# Patient Record
Sex: FEMALE | Race: WHITE | Employment: PART TIME | URBAN - METROPOLITAN AREA
[De-identification: names, ages, dates, MRNs, and addresses within clinical notes are randomized per-mention and may not be internally consistent; named-entity substitution may affect disease eponyms.]

---

## 2018-12-12 ENCOUNTER — HOSPITAL ENCOUNTER (EMERGENCY)
Age: 73
Discharge: HOME | End: 2018-12-12
Attending: EMERGENCY MEDICINE
Payer: COMMERCIAL

## 2018-12-12 ENCOUNTER — APPOINTMENT (OUTPATIENT)
Dept: GENERAL RADIOLOGY | Age: 73
End: 2018-12-12
Payer: COMMERCIAL

## 2018-12-12 VITALS
DIASTOLIC BLOOD PRESSURE: 79 MMHG | OXYGEN SATURATION: 100 % | HEART RATE: 74 BPM | RESPIRATION RATE: 20 BRPM | WEIGHT: 145 LBS | TEMPERATURE: 98 F | BODY MASS INDEX: 25.69 KG/M2 | SYSTOLIC BLOOD PRESSURE: 191 MMHG | HEIGHT: 63 IN

## 2018-12-12 DIAGNOSIS — S82.035A CLOSED NONDISPLACED TRANSVERSE FRACTURE OF LEFT PATELLA, INITIAL ENCOUNTER: Primary | ICD-10-CM

## 2018-12-12 PROCEDURE — 6370000000 HC RX 637 (ALT 250 FOR IP): Performed by: EMERGENCY MEDICINE

## 2018-12-12 PROCEDURE — 6360000002 HC RX W HCPCS: Performed by: EMERGENCY MEDICINE

## 2018-12-12 PROCEDURE — 99283 EMERGENCY DEPT VISIT LOW MDM: CPT

## 2018-12-12 PROCEDURE — 73562 X-RAY EXAM OF KNEE 3: CPT

## 2018-12-12 PROCEDURE — 96374 THER/PROPH/DIAG INJ IV PUSH: CPT

## 2018-12-12 RX ORDER — TRAMADOL HYDROCHLORIDE 50 MG/1
50 TABLET ORAL ONCE
Status: COMPLETED | OUTPATIENT
Start: 2018-12-12 | End: 2018-12-12

## 2018-12-12 RX ORDER — TRAMADOL HYDROCHLORIDE 50 MG/1
50 TABLET ORAL EVERY 8 HOURS PRN
Qty: 20 TABLET | Refills: 0 | Status: SHIPPED | OUTPATIENT
Start: 2018-12-12 | End: 2018-12-12

## 2018-12-12 RX ORDER — SIMVASTATIN 80 MG
80 TABLET ORAL NIGHTLY
COMMUNITY

## 2018-12-12 RX ORDER — LEVOTHYROXINE SODIUM 0.12 MG/1
125 TABLET ORAL DAILY
COMMUNITY

## 2018-12-12 RX ORDER — OMEPRAZOLE 20 MG/1
20 CAPSULE, DELAYED RELEASE ORAL DAILY
COMMUNITY

## 2018-12-12 RX ORDER — ONDANSETRON 2 MG/ML
4 INJECTION INTRAMUSCULAR; INTRAVENOUS ONCE
Status: COMPLETED | OUTPATIENT
Start: 2018-12-12 | End: 2018-12-12

## 2018-12-12 RX ORDER — HYDROCODONE BITARTRATE AND ACETAMINOPHEN 5; 325 MG/1; MG/1
1 TABLET ORAL EVERY 6 HOURS PRN
Qty: 20 TABLET | Refills: 0 | Status: SHIPPED | OUTPATIENT
Start: 2018-12-12 | End: 2018-12-18

## 2018-12-12 RX ADMIN — TRAMADOL HYDROCHLORIDE 50 MG: 50 TABLET, FILM COATED ORAL at 18:40

## 2018-12-12 RX ADMIN — ONDANSETRON 4 MG: 2 SOLUTION INTRAMUSCULAR; INTRAVENOUS at 17:29

## 2018-12-12 ASSESSMENT — PAIN DESCRIPTION - ORIENTATION: ORIENTATION: LEFT

## 2018-12-12 ASSESSMENT — ENCOUNTER SYMPTOMS
FACIAL SWELLING: 0
VOICE CHANGE: 0
SORE THROAT: 0
CHEST TIGHTNESS: 0
VOMITING: 0
COUGH: 0
EYE DISCHARGE: 0
DIARRHEA: 0
BLOOD IN STOOL: 0
BACK PAIN: 0
WHEEZING: 0
SINUS PRESSURE: 0
ABDOMINAL PAIN: 0
TROUBLE SWALLOWING: 0
CONSTIPATION: 0
SHORTNESS OF BREATH: 0
CHOKING: 0
EYE REDNESS: 0
EYE PAIN: 0
STRIDOR: 0

## 2018-12-12 ASSESSMENT — PAIN DESCRIPTION - DESCRIPTORS: DESCRIPTORS: SHARP;THROBBING

## 2018-12-12 ASSESSMENT — PAIN DESCRIPTION - LOCATION
LOCATION: KNEE
LOCATION: KNEE

## 2018-12-12 ASSESSMENT — PAIN SCALES - GENERAL
PAINLEVEL_OUTOF10: 10
PAINLEVEL_OUTOF10: 10

## 2018-12-12 ASSESSMENT — PAIN DESCRIPTION - ONSET: ONSET: SUDDEN

## 2018-12-12 ASSESSMENT — PAIN DESCRIPTION - PROGRESSION: CLINICAL_PROGRESSION: NOT CHANGED

## 2018-12-12 ASSESSMENT — PAIN DESCRIPTION - FREQUENCY: FREQUENCY: INTERMITTENT

## 2018-12-12 ASSESSMENT — PAIN DESCRIPTION - PAIN TYPE: TYPE: ACUTE PAIN

## 2018-12-12 NOTE — ED PROVIDER NOTES
m) 145 lb (65.8 kg)       Physical Exam   Constitutional: She is oriented to person, place, and time. She appears well-developed and well-nourished. She appears distressed. HENT:   Head: Normocephalic. Right Ear: External ear normal.   Left Ear: External ear normal.   Nose: Nose normal.   Mouth/Throat: Oropharynx is clear and moist.   Eyes: Pupils are equal, round, and reactive to light. Conjunctivae and EOM are normal.   Neck: Normal range of motion. Neck supple. Cardiovascular: Normal rate and normal heart sounds. Exam reveals no gallop. No murmur heard. Pulmonary/Chest: Breath sounds normal. No respiratory distress. She has no wheezes. Abdominal: Soft. Bowel sounds are normal. She exhibits no mass. There is no rebound. Musculoskeletal: Normal range of motion. She exhibits edema and tenderness. Attention  of the left knee patient has a minimal joint effusion and range of motion slightly diminished has no point tenderness present no laceration or hematoma over the left knee neurovascular is intact   Neurological: She is alert and oriented to person, place, and time. No cranial nerve deficit. She exhibits normal muscle tone. Skin: Skin is warm. No rash noted. No erythema. Psychiatric: Her behavior is normal. Thought content normal.   Nursing note and vitals reviewed. DIAGNOSTIC RESULTS     EKG: All EKG's are interpreted by the Emergency Department Physician who either signs or Co-signsthis chart in the absence of a cardiologist.        RADIOLOGY:   Hines Raudel such as CT, Ultrasound and MRI are read by the radiologist. Plain radiographic images are visualized and preliminarily interpreted by the emergency physician with the below findings:        Interpretation per the Radiologist below, if available at the time ofthis note:    XR KNEE LEFT (3 VIEWS)   Final Result   NONDISPLACED LEFT PATELLAR FRACTURE WITH LEFT KNEE EFFUSION.             ED BEDSIDE ULTRASOUND:   Performed by ED

## 2018-12-12 NOTE — ED TRIAGE NOTES
Bertha Ballesteros at work and injured her left knee. patient has had several surgeries after she had injured the same knee in 1982. Slipped on a piece of [employer].

## 2018-12-12 NOTE — ED NOTES
Registration at bedside with workers comp papers     [facility]  12/12/18 [id number]

## 2019-12-31 ENCOUNTER — HOSPITAL ENCOUNTER (OUTPATIENT)
Dept: LAB | Age: 74
Discharge: HOME | End: 2019-12-31
Payer: MEDICARE

## 2019-12-31 LAB
ALBUMIN SERPL-MCNC: 4.3 G/DL (ref 3.5–4.6)
ALP BLD-CCNC: 68 U/L (ref 40–130)
ALT SERPL-CCNC: 34 U/L (ref 0–33)
ANION GAP SERPL CALCULATED.3IONS-SCNC: 14 MEQ/L (ref 9–15)
AST SERPL-CCNC: 42 U/L (ref 0–35)
BILIRUB SERPL-MCNC: 0.4 MG/DL (ref 0.2–0.7)
BUN BLDV-MCNC: 14 MG/DL (ref 8–23)
CALCIUM SERPL-MCNC: 9.4 MG/DL (ref 8.5–9.9)
CHLORIDE BLD-SCNC: 101 MEQ/L (ref 95–107)
CHOLESTEROL, TOTAL: 171 MG/DL (ref 0–199)
CO2: 26 MEQ/L (ref 20–31)
CREAT SERPL-MCNC: 0.74 MG/DL (ref 0.5–0.9)
GFR AFRICAN AMERICAN: >60
GFR NON-AFRICAN AMERICAN: >60
GLOBULIN: 2.9 G/DL (ref 2.3–3.5)
GLUCOSE BLD-MCNC: 111 MG/DL (ref 70–99)
HCT VFR BLD CALC: 38.2 % (ref 37–47)
HDLC SERPL-MCNC: 48 MG/DL (ref 40–59)
HEMOGLOBIN: 12.7 G/DL (ref 12–16)
LDL CHOLESTEROL CALCULATED: 95 MG/DL (ref 0–129)
MCH RBC QN AUTO: 30.5 PG (ref 27–31.3)
MCHC RBC AUTO-ENTMCNC: 33.2 % (ref 33–37)
MCV RBC AUTO: 91.8 FL (ref 82–100)
PDW BLD-RTO: 13.1 % (ref 11.5–14.5)
PLATELET # BLD: 310 K/UL (ref 130–400)
POTASSIUM SERPL-SCNC: 4.6 MEQ/L (ref 3.4–4.9)
RBC # BLD: 4.16 M/UL (ref 4.2–5.4)
SODIUM BLD-SCNC: 141 MEQ/L (ref 135–144)
TOTAL PROTEIN: 7.2 G/DL (ref 6.3–8)
TRIGL SERPL-MCNC: 138 MG/DL (ref 0–150)
WBC # BLD: 7.7 K/UL (ref 4.8–10.8)

## 2019-12-31 PROCEDURE — 85027 COMPLETE CBC AUTOMATED: CPT

## 2019-12-31 PROCEDURE — 80061 LIPID PANEL: CPT

## 2019-12-31 PROCEDURE — 80053 COMPREHEN METABOLIC PANEL: CPT

## 2019-12-31 PROCEDURE — 36415 COLL VENOUS BLD VENIPUNCTURE: CPT

## 2020-10-22 ENCOUNTER — HOSPITAL ENCOUNTER (OUTPATIENT)
Dept: LAB | Age: 75
Discharge: HOME | End: 2020-10-22
Payer: MEDICARE

## 2020-10-22 LAB
ALBUMIN SERPL-MCNC: 4.5 G/DL (ref 3.5–4.6)
ALP BLD-CCNC: 59 U/L (ref 40–130)
ALT SERPL-CCNC: 28 U/L (ref 0–33)
ANION GAP SERPL CALCULATED.3IONS-SCNC: 12 MEQ/L (ref 9–15)
AST SERPL-CCNC: 46 U/L (ref 0–35)
BILIRUB SERPL-MCNC: 0.4 MG/DL (ref 0.2–0.7)
BUN BLDV-MCNC: 12 MG/DL (ref 8–23)
CALCIUM SERPL-MCNC: 9.3 MG/DL (ref 8.5–9.9)
CHLORIDE BLD-SCNC: 102 MEQ/L (ref 95–107)
CHOLESTEROL, TOTAL: 134 MG/DL (ref 0–199)
CO2: 26 MEQ/L (ref 20–31)
CREAT SERPL-MCNC: 0.72 MG/DL (ref 0.5–0.9)
GFR AFRICAN AMERICAN: >60
GFR NON-AFRICAN AMERICAN: >60
GLOBULIN: 2.5 G/DL (ref 2.3–3.5)
GLUCOSE BLD-MCNC: 119 MG/DL (ref 70–99)
HDLC SERPL-MCNC: 53 MG/DL (ref 40–59)
LDL CHOLESTEROL CALCULATED: 66 MG/DL (ref 0–129)
POTASSIUM SERPL-SCNC: 4.2 MEQ/L (ref 3.4–4.9)
SEDIMENTATION RATE, ERYTHROCYTE: 15 MM (ref 0–30)
SODIUM BLD-SCNC: 140 MEQ/L (ref 135–144)
TOTAL PROTEIN: 7 G/DL (ref 6.3–8)
TRIGL SERPL-MCNC: 77 MG/DL (ref 0–150)

## 2020-10-22 PROCEDURE — 80061 LIPID PANEL: CPT

## 2020-10-22 PROCEDURE — 80053 COMPREHEN METABOLIC PANEL: CPT

## 2020-10-22 PROCEDURE — 36415 COLL VENOUS BLD VENIPUNCTURE: CPT

## 2020-10-22 PROCEDURE — 85652 RBC SED RATE AUTOMATED: CPT

## 2021-04-19 ENCOUNTER — HOSPITAL ENCOUNTER (OUTPATIENT)
Dept: LAB | Age: 76
Discharge: HOME | End: 2021-04-19
Payer: MEDICARE

## 2021-04-19 LAB
ALBUMIN SERPL-MCNC: 4.4 G/DL (ref 3.5–4.6)
ALP BLD-CCNC: 62 U/L (ref 40–130)
ALT SERPL-CCNC: 33 U/L (ref 0–33)
ANION GAP SERPL CALCULATED.3IONS-SCNC: 11 MEQ/L (ref 9–15)
AST SERPL-CCNC: 62 U/L (ref 0–35)
BASOPHILS ABSOLUTE: 0.1 K/UL (ref 0–0.2)
BASOPHILS RELATIVE PERCENT: 1.1 %
BILIRUB SERPL-MCNC: 0.4 MG/DL (ref 0.2–0.7)
BUN BLDV-MCNC: 14 MG/DL (ref 8–23)
CALCIUM SERPL-MCNC: 9.5 MG/DL (ref 8.5–9.9)
CHLORIDE BLD-SCNC: 105 MEQ/L (ref 95–107)
CHOLESTEROL, TOTAL: 134 MG/DL (ref 0–199)
CO2: 25 MEQ/L (ref 20–31)
CREAT SERPL-MCNC: 0.65 MG/DL (ref 0.5–0.9)
EOSINOPHILS ABSOLUTE: 0.2 K/UL (ref 0–0.7)
EOSINOPHILS RELATIVE PERCENT: 3.5 %
GFR AFRICAN AMERICAN: >60
GFR NON-AFRICAN AMERICAN: >60
GLOBULIN: 2.2 G/DL (ref 2.3–3.5)
GLUCOSE BLD-MCNC: 113 MG/DL (ref 70–99)
HCT VFR BLD CALC: 37.2 % (ref 37–47)
HDLC SERPL-MCNC: 52 MG/DL (ref 40–59)
HEMOGLOBIN: 12.3 G/DL (ref 12–16)
LDL CHOLESTEROL CALCULATED: 68 MG/DL (ref 0–129)
LYMPHOCYTES ABSOLUTE: 1.2 K/UL (ref 1–4.8)
LYMPHOCYTES RELATIVE PERCENT: 17.8 %
MCH RBC QN AUTO: 29.4 PG (ref 27–31.3)
MCHC RBC AUTO-ENTMCNC: 33.1 % (ref 33–37)
MCV RBC AUTO: 88.6 FL (ref 82–100)
MONOCYTES ABSOLUTE: 0.6 K/UL (ref 0.2–0.8)
MONOCYTES RELATIVE PERCENT: 8.7 %
NEUTROPHILS ABSOLUTE: 4.7 K/UL (ref 1.4–6.5)
NEUTROPHILS RELATIVE PERCENT: 68.9 %
PDW BLD-RTO: 13.3 % (ref 11.5–14.5)
PLATELET # BLD: 256 K/UL (ref 130–400)
POTASSIUM SERPL-SCNC: 4.1 MEQ/L (ref 3.4–4.9)
RBC # BLD: 4.2 M/UL (ref 4.2–5.4)
SODIUM BLD-SCNC: 141 MEQ/L (ref 135–144)
T4 FREE: 1.28 NG/DL (ref 0.84–1.68)
TOTAL PROTEIN: 6.6 G/DL (ref 6.3–8)
TRIGL SERPL-MCNC: 70 MG/DL (ref 0–150)
TSH SERPL DL<=0.05 MIU/L-ACNC: 1.96 UIU/ML (ref 0.44–3.86)
WBC # BLD: 6.7 K/UL (ref 4.8–10.8)

## 2021-04-19 PROCEDURE — 84443 ASSAY THYROID STIM HORMONE: CPT

## 2021-04-19 PROCEDURE — 80061 LIPID PANEL: CPT

## 2021-04-19 PROCEDURE — 84439 ASSAY OF FREE THYROXINE: CPT

## 2021-04-19 PROCEDURE — 86803 HEPATITIS C AB TEST: CPT

## 2021-04-19 PROCEDURE — 80053 COMPREHEN METABOLIC PANEL: CPT

## 2021-04-19 PROCEDURE — 85025 COMPLETE CBC W/AUTO DIFF WBC: CPT

## 2021-04-19 PROCEDURE — 36415 COLL VENOUS BLD VENIPUNCTURE: CPT

## 2021-04-21 LAB — HEPATITIS C ANTIBODY INTERPRETATION: NORMAL

## 2021-12-14 ENCOUNTER — HOSPITAL ENCOUNTER (OUTPATIENT)
Dept: LAB | Age: 76
Discharge: HOME | End: 2021-12-14
Payer: MEDICARE

## 2021-12-14 LAB
ALBUMIN SERPL-MCNC: 4.5 G/DL (ref 3.5–4.6)
ALP BLD-CCNC: 58 U/L (ref 40–130)
ALT SERPL-CCNC: 28 U/L (ref 0–33)
ANION GAP SERPL CALCULATED.3IONS-SCNC: 10 MEQ/L (ref 9–15)
AST SERPL-CCNC: 38 U/L (ref 0–35)
BILIRUB SERPL-MCNC: 0.3 MG/DL (ref 0.2–0.7)
BUN BLDV-MCNC: 13 MG/DL (ref 8–23)
CALCIUM SERPL-MCNC: 9.2 MG/DL (ref 8.5–9.9)
CHLORIDE BLD-SCNC: 106 MEQ/L (ref 95–107)
CHOLESTEROL, TOTAL: 149 MG/DL (ref 0–199)
CO2: 25 MEQ/L (ref 20–31)
CREAT SERPL-MCNC: 0.7 MG/DL (ref 0.5–0.9)
GFR AFRICAN AMERICAN: >60
GFR NON-AFRICAN AMERICAN: >60
GLOBULIN: 2.6 G/DL (ref 2.3–3.5)
GLUCOSE BLD-MCNC: 122 MG/DL (ref 70–99)
HDLC SERPL-MCNC: 51 MG/DL (ref 40–59)
LDL CHOLESTEROL CALCULATED: 83 MG/DL (ref 0–129)
POTASSIUM SERPL-SCNC: 4.3 MEQ/L (ref 3.4–4.9)
SODIUM BLD-SCNC: 141 MEQ/L (ref 135–144)
T4 FREE: 1.54 NG/DL (ref 0.84–1.68)
TOTAL PROTEIN: 7.1 G/DL (ref 6.3–8)
TRIGL SERPL-MCNC: 73 MG/DL (ref 0–150)
TSH SERPL DL<=0.05 MIU/L-ACNC: 1.9 UIU/ML (ref 0.44–3.86)

## 2021-12-14 PROCEDURE — 80061 LIPID PANEL: CPT

## 2021-12-14 PROCEDURE — 84439 ASSAY OF FREE THYROXINE: CPT

## 2021-12-14 PROCEDURE — 36415 COLL VENOUS BLD VENIPUNCTURE: CPT

## 2021-12-14 PROCEDURE — 80053 COMPREHEN METABOLIC PANEL: CPT

## 2021-12-14 PROCEDURE — 84443 ASSAY THYROID STIM HORMONE: CPT

## 2022-10-31 ENCOUNTER — HOSPITAL ENCOUNTER (OUTPATIENT)
Dept: LAB | Age: 77
Discharge: HOME | End: 2022-10-31
Payer: MEDICARE

## 2022-10-31 LAB
ANION GAP SERPL CALCULATED.3IONS-SCNC: 14 MEQ/L (ref 9–15)
BUN BLDV-MCNC: 25 MG/DL (ref 8–23)
CALCIUM SERPL-MCNC: 9.2 MG/DL (ref 8.5–9.9)
CHLORIDE BLD-SCNC: 105 MEQ/L (ref 95–107)
CO2: 25 MEQ/L (ref 20–31)
CREAT SERPL-MCNC: 0.96 MG/DL (ref 0.5–0.9)
GFR SERPL CREATININE-BSD FRML MDRD: >60 ML/MIN/{1.73_M2}
GLUCOSE BLD-MCNC: 121 MG/DL (ref 70–99)
MAGNESIUM: 2 MG/DL (ref 1.7–2.4)
POTASSIUM SERPL-SCNC: 4.3 MEQ/L (ref 3.4–4.9)
SODIUM BLD-SCNC: 144 MEQ/L (ref 135–144)
TSH SERPL DL<=0.05 MIU/L-ACNC: 0.55 UIU/ML (ref 0.44–3.86)

## 2022-10-31 PROCEDURE — 80048 BASIC METABOLIC PNL TOTAL CA: CPT

## 2022-10-31 PROCEDURE — 83735 ASSAY OF MAGNESIUM: CPT

## 2022-10-31 PROCEDURE — 36415 COLL VENOUS BLD VENIPUNCTURE: CPT

## 2022-10-31 PROCEDURE — 84443 ASSAY THYROID STIM HORMONE: CPT

## 2023-01-26 PROBLEM — K21.9 GERD (GASTROESOPHAGEAL REFLUX DISEASE): Status: ACTIVE | Noted: 2023-01-26

## 2023-01-26 PROBLEM — I65.29 CAROTID STENOSIS, ASYMPTOMATIC: Status: ACTIVE | Noted: 2023-01-26

## 2023-01-26 PROBLEM — E78.5 HYPERLIPIDEMIA: Status: ACTIVE | Noted: 2023-01-26

## 2023-01-26 PROBLEM — R06.02 SHORTNESS OF BREATH ON EXERTION: Status: ACTIVE | Noted: 2023-01-26

## 2023-01-26 PROBLEM — K12.1 STOMATITIS: Status: ACTIVE | Noted: 2023-01-26

## 2023-01-26 PROBLEM — M17.9 OSTEOARTHRITIS OF KNEE: Status: ACTIVE | Noted: 2023-01-26

## 2023-01-26 PROBLEM — S61.219A FINGER LACERATION: Status: ACTIVE | Noted: 2023-01-26

## 2023-01-26 PROBLEM — I65.23 ASYMPTOMATIC BILATERAL CAROTID ARTERY STENOSIS: Status: ACTIVE | Noted: 2023-01-26

## 2023-01-26 PROBLEM — M48.061 LUMBAR SPINAL STENOSIS: Status: ACTIVE | Noted: 2023-01-26

## 2023-01-26 PROBLEM — I77.9 CAROTID ARTERIAL DISEASE (CMS-HCC): Status: ACTIVE | Noted: 2023-01-26

## 2023-01-26 PROBLEM — I35.2 AORTIC INSUFFICIENCY WITH AORTIC STENOSIS: Status: ACTIVE | Noted: 2023-01-26

## 2023-01-26 PROBLEM — M79.673 FOOT PAIN: Status: ACTIVE | Noted: 2023-01-26

## 2023-01-26 PROBLEM — M85.80 OSTEOPENIA: Status: ACTIVE | Noted: 2023-01-26

## 2023-01-26 PROBLEM — J32.9 SINOBRONCHITIS: Status: ACTIVE | Noted: 2023-01-26

## 2023-01-26 PROBLEM — I65.29 ARTERIOSCLEROSIS OF CAROTID ARTERY: Status: ACTIVE | Noted: 2023-01-26

## 2023-01-26 PROBLEM — M79.7 FIBROMYALGIA: Status: ACTIVE | Noted: 2023-01-26

## 2023-01-26 PROBLEM — E03.9 HYPOTHYROIDISM: Status: ACTIVE | Noted: 2023-01-26

## 2023-01-26 PROBLEM — J40 SINOBRONCHITIS: Status: ACTIVE | Noted: 2023-01-26

## 2023-01-26 PROBLEM — E66.3 OVERWEIGHT WITH BODY MASS INDEX (BMI) OF 27 TO 27.9 IN ADULT: Status: ACTIVE | Noted: 2023-01-26

## 2023-01-26 PROBLEM — R05.8 ACE-INHIBITOR COUGH: Status: ACTIVE | Noted: 2023-01-26

## 2023-01-26 PROBLEM — E78.5 DYSLIPIDEMIA: Status: ACTIVE | Noted: 2023-01-26

## 2023-01-26 PROBLEM — U07.1 COVID-19: Status: ACTIVE | Noted: 2023-01-26

## 2023-01-26 PROBLEM — Q21.0 VENTRICULAR SEPTAL DEFECT (HHS-HCC): Status: ACTIVE | Noted: 2023-01-26

## 2023-01-26 PROBLEM — R00.2 PALPITATIONS: Status: ACTIVE | Noted: 2023-01-26

## 2023-01-26 PROBLEM — T46.4X5A ACE-INHIBITOR COUGH: Status: ACTIVE | Noted: 2023-01-26

## 2023-01-26 PROBLEM — R19.4 CHANGE IN BOWEL HABIT: Status: ACTIVE | Noted: 2023-01-26

## 2023-01-26 PROBLEM — R01.1 SYSTOLIC MURMUR: Status: ACTIVE | Noted: 2023-01-26

## 2023-01-26 PROBLEM — I35.9 AORTIC VALVE DISEASE: Status: ACTIVE | Noted: 2023-01-26

## 2023-01-26 PROBLEM — J45.909 ASTHMA (HHS-HCC): Status: ACTIVE | Noted: 2023-01-26

## 2023-01-26 PROBLEM — R92.8 ABNORMAL MAMMOGRAM: Status: ACTIVE | Noted: 2023-01-26

## 2023-01-26 PROBLEM — E05.90 HYPERTHYROIDISM: Status: ACTIVE | Noted: 2023-01-26

## 2023-01-26 PROBLEM — E55.9 VITAMIN D DEFICIENCY: Status: ACTIVE | Noted: 2023-01-26

## 2023-01-26 PROBLEM — I10 PRIMARY HYPERTENSION: Status: ACTIVE | Noted: 2023-01-26

## 2023-01-26 PROBLEM — M81.0 OSTEOPOROSIS: Status: ACTIVE | Noted: 2023-01-26

## 2023-01-26 PROBLEM — L30.9 VULVAR DERMATITIS: Status: ACTIVE | Noted: 2023-01-26

## 2023-04-28 ENCOUNTER — HOSPITAL ENCOUNTER (OUTPATIENT)
Dept: PREADMISSION TESTING | Age: 78
Discharge: HOME | End: 2023-05-02
Payer: COMMERCIAL

## 2023-04-28 ENCOUNTER — HOSPITAL ENCOUNTER (OUTPATIENT)
Dept: GENERAL RADIOLOGY | Age: 78
Discharge: HOME | End: 2023-04-28
Payer: COMMERCIAL

## 2023-04-28 VITALS
TEMPERATURE: 97.8 F | HEART RATE: 80 BPM | HEIGHT: 61 IN | BODY MASS INDEX: 29.64 KG/M2 | OXYGEN SATURATION: 98 % | DIASTOLIC BLOOD PRESSURE: 58 MMHG | SYSTOLIC BLOOD PRESSURE: 166 MMHG | WEIGHT: 157 LBS | RESPIRATION RATE: 16 BRPM

## 2023-04-28 DIAGNOSIS — M24.662 ARTHROFIBROSIS OF KNEE JOINT, LEFT: ICD-10-CM

## 2023-04-28 PROBLEM — R01.1 HEART MURMUR: Status: ACTIVE | Noted: 2023-04-28

## 2023-04-28 PROBLEM — M81.0 OSTEOPOROSIS: Status: ACTIVE | Noted: 2023-01-26

## 2023-04-28 PROBLEM — S82.035A CLOSED NONDISPLACED TRANSVERSE FRACTURE OF LEFT PATELLA: Status: ACTIVE | Noted: 2023-04-28

## 2023-04-28 PROBLEM — K21.9 GERD (GASTROESOPHAGEAL REFLUX DISEASE): Status: ACTIVE | Noted: 2023-01-26

## 2023-04-28 PROBLEM — I65.23 OCCLUSION AND STENOSIS OF BILATERAL CAROTID ARTERIES: Status: ACTIVE | Noted: 2022-07-29

## 2023-04-28 PROBLEM — E78.5 HYPERLIPIDEMIA: Status: ACTIVE | Noted: 2019-04-02

## 2023-04-28 PROBLEM — M17.9 OSTEOARTHRITIS OF KNEE: Status: ACTIVE | Noted: 2023-01-26

## 2023-04-28 PROBLEM — E03.9 HYPOTHYROIDISM: Status: ACTIVE | Noted: 2023-01-26

## 2023-04-28 PROBLEM — M79.7 FIBROMYALGIA: Status: ACTIVE | Noted: 2023-01-26

## 2023-04-28 PROBLEM — I65.29 STENOSIS OF CAROTID ARTERY: Status: ACTIVE | Noted: 2019-04-02

## 2023-04-28 PROBLEM — J45.909 ASTHMA: Status: ACTIVE | Noted: 2018-12-19

## 2023-04-28 PROBLEM — E78.5 DYSLIPIDEMIA: Status: ACTIVE | Noted: 2023-01-26

## 2023-04-28 LAB
ALBUMIN SERPL-MCNC: 4.3 G/DL (ref 3.5–4.6)
ALP SERPL-CCNC: 68 U/L (ref 40–130)
ALT SERPL-CCNC: 17 U/L (ref 0–33)
ANION GAP SERPL CALCULATED.3IONS-SCNC: 11 MEQ/L (ref 9–15)
AST SERPL-CCNC: 20 U/L (ref 0–35)
BASOPHILS # BLD: 0 K/UL (ref 0–0.2)
BASOPHILS NFR BLD: 0.6 %
BILIRUB SERPL-MCNC: <0.2 MG/DL (ref 0.2–0.7)
BUN SERPL-MCNC: 22 MG/DL (ref 8–23)
CALCIUM SERPL-MCNC: 9.8 MG/DL (ref 8.5–9.9)
CHLORIDE SERPL-SCNC: 103 MEQ/L (ref 95–107)
CO2 SERPL-SCNC: 26 MEQ/L (ref 20–31)
CREAT SERPL-MCNC: 0.79 MG/DL (ref 0.5–0.9)
EOSINOPHIL # BLD: 0.4 K/UL (ref 0–0.7)
EOSINOPHIL NFR BLD: 4.7 %
ERYTHROCYTE [DISTWIDTH] IN BLOOD BY AUTOMATED COUNT: 14.1 % (ref 11.5–14.5)
GLOBULIN SER CALC-MCNC: 2.4 G/DL (ref 2.3–3.5)
GLUCOSE SERPL-MCNC: 103 MG/DL (ref 70–99)
HCT VFR BLD AUTO: 32.4 % (ref 37–47)
HGB BLD-MCNC: 10.8 G/DL (ref 12–16)
LYMPHOCYTES # BLD: 1.5 K/UL (ref 1–4.8)
LYMPHOCYTES NFR BLD: 19.1 %
MCH RBC QN AUTO: 28.2 PG (ref 27–31.3)
MCHC RBC AUTO-ENTMCNC: 33.4 % (ref 33–37)
MCV RBC AUTO: 84.5 FL (ref 79.4–94.8)
MONOCYTES # BLD: 0.8 K/UL (ref 0.2–0.8)
MONOCYTES NFR BLD: 10.8 %
NEUTROPHILS # BLD: 5.1 K/UL (ref 1.4–6.5)
NEUTS SEG NFR BLD: 64.8 %
PLATELET # BLD AUTO: 265 K/UL (ref 130–400)
POTASSIUM SERPL-SCNC: 3.9 MEQ/L (ref 3.4–4.9)
PROT SERPL-MCNC: 6.7 G/DL (ref 6.3–8)
RBC # BLD AUTO: 3.83 M/UL (ref 4.2–5.4)
SODIUM SERPL-SCNC: 140 MEQ/L (ref 135–144)
WBC # BLD AUTO: 7.8 K/UL (ref 4.8–10.8)

## 2023-04-28 PROCEDURE — 85025 COMPLETE CBC W/AUTO DIFF WBC: CPT

## 2023-04-28 PROCEDURE — 80053 COMPREHEN METABOLIC PANEL: CPT

## 2023-04-28 PROCEDURE — 71046 X-RAY EXAM CHEST 2 VIEWS: CPT

## 2023-04-28 PROCEDURE — 93005 ELECTROCARDIOGRAM TRACING: CPT | Performed by: FAMILY MEDICINE

## 2023-04-28 RX ORDER — LOSARTAN POTASSIUM 50 MG/1
50 TABLET ORAL DAILY
Status: ON HOLD | COMMUNITY
Start: 2022-11-30

## 2023-04-28 RX ORDER — SODIUM CHLORIDE 0.9 % (FLUSH) 0.9 %
5-40 SYRINGE (ML) INJECTION PRN
Status: CANCELLED | OUTPATIENT
Start: 2023-05-05

## 2023-04-28 RX ORDER — ALENDRONATE SODIUM 70 MG/1
70 TABLET ORAL WEEKLY
Status: ON HOLD | COMMUNITY
Start: 2022-01-31

## 2023-04-28 RX ORDER — AMLODIPINE BESYLATE 5 MG/1
5 TABLET ORAL DAILY
Status: ON HOLD | COMMUNITY
Start: 2020-12-28

## 2023-04-28 RX ORDER — NICOTINE POLACRILEX 2 MG
GUM BUCCAL
Status: ON HOLD | COMMUNITY

## 2023-04-28 RX ORDER — LIDOCAINE HYDROCHLORIDE 10 MG/ML
1 INJECTION, SOLUTION EPIDURAL; INFILTRATION; INTRACAUDAL; PERINEURAL
Status: CANCELLED | OUTPATIENT
Start: 2023-05-05 | End: 2023-05-06

## 2023-04-28 RX ORDER — SODIUM CHLORIDE 9 MG/ML
INJECTION, SOLUTION INTRAVENOUS PRN
Status: CANCELLED | OUTPATIENT
Start: 2023-05-05

## 2023-04-28 RX ORDER — CEFAZOLIN SODIUM IN 0.9 % NACL 2 G/100 ML
2000 PLASTIC BAG, INJECTION (ML) INTRAVENOUS ONCE
Status: CANCELLED | OUTPATIENT
Start: 2023-05-05

## 2023-04-28 RX ORDER — SODIUM CHLORIDE, SODIUM LACTATE, POTASSIUM CHLORIDE, CALCIUM CHLORIDE 600; 310; 30; 20 MG/100ML; MG/100ML; MG/100ML; MG/100ML
INJECTION, SOLUTION INTRAVENOUS CONTINUOUS
Status: CANCELLED | OUTPATIENT
Start: 2023-05-05

## 2023-04-28 RX ORDER — HYDROCHLOROTHIAZIDE 12.5 MG/1
12.5 TABLET ORAL DAILY
Status: ON HOLD | COMMUNITY
Start: 2022-06-06

## 2023-04-28 RX ORDER — SODIUM CHLORIDE 0.9 % (FLUSH) 0.9 %
5-40 SYRINGE (ML) INJECTION EVERY 12 HOURS SCHEDULED
Status: CANCELLED | OUTPATIENT
Start: 2023-05-05

## 2023-04-28 ASSESSMENT — ENCOUNTER SYMPTOMS
SORE THROAT: 0
CHEST TIGHTNESS: 0
FACIAL SWELLING: 0
ABDOMINAL PAIN: 0
SINUS PAIN: 0
WHEEZING: 0
EYE PAIN: 0
COUGH: 0
CHOKING: 0
CONSTIPATION: 1
EYE REDNESS: 0
SINUS PRESSURE: 0
APNEA: 0
PHOTOPHOBIA: 0
EYE DISCHARGE: 0
EYE ITCHING: 0
VOMITING: 0
RHINORRHEA: 0
NAUSEA: 0
DIARRHEA: 0
BACK PAIN: 0
TROUBLE SWALLOWING: 0
ABDOMINAL DISTENTION: 0
SHORTNESS OF BREATH: 0

## 2023-04-30 LAB
EKG ATRIAL RATE: 68 BPM
EKG P AXIS: 38 DEGREES
EKG P-R INTERVAL: 152 MS
EKG Q-T INTERVAL: 414 MS
EKG QRS DURATION: 88 MS
EKG QTC CALCULATION (BAZETT): 440 MS
EKG R AXIS: -26 DEGREES
EKG T AXIS: 22 DEGREES
EKG VENTRICULAR RATE: 68 BPM

## 2023-05-04 ENCOUNTER — ANESTHESIA EVENT (OUTPATIENT)
Dept: OPERATING ROOM | Age: 78
End: 2023-05-04
Payer: COMMERCIAL

## 2023-05-05 ENCOUNTER — ANESTHESIA (OUTPATIENT)
Dept: OPERATING ROOM | Age: 78
End: 2023-05-05
Payer: COMMERCIAL

## 2023-05-05 ENCOUNTER — HOSPITAL ENCOUNTER (OUTPATIENT)
Dept: GENERAL RADIOLOGY | Age: 78
Discharge: HOME | DRG: 982 | End: 2023-05-07
Attending: ORTHOPAEDIC SURGERY
Payer: COMMERCIAL

## 2023-05-05 ENCOUNTER — HOSPITAL ENCOUNTER (INPATIENT)
Age: 78
LOS: 1 days | Discharge: HOME | DRG: 982 | End: 2023-05-08
Attending: ORTHOPAEDIC SURGERY | Admitting: FAMILY MEDICINE
Payer: COMMERCIAL

## 2023-05-05 DIAGNOSIS — R52 PAIN: ICD-10-CM

## 2023-05-05 DIAGNOSIS — S82.035A CLOSED NONDISPLACED TRANSVERSE FRACTURE OF LEFT PATELLA, INITIAL ENCOUNTER: Primary | ICD-10-CM

## 2023-05-05 DIAGNOSIS — M24.662 ARTHROFIBROSIS OF KNEE JOINT, LEFT: ICD-10-CM

## 2023-05-05 PROBLEM — J96.01 ACUTE RESPIRATORY FAILURE WITH HYPOXIA (HCC): Status: ACTIVE | Noted: 2023-05-05

## 2023-05-05 PROBLEM — R09.02 HYPOXIA: Status: ACTIVE | Noted: 2023-05-05

## 2023-05-05 PROCEDURE — 94761 N-INVAS EAR/PLS OXIMETRY MLT: CPT

## 2023-05-05 PROCEDURE — 94664 DEMO&/EVAL PT USE INHALER: CPT

## 2023-05-05 PROCEDURE — 2580000003 HC RX 258: Performed by: ORTHOPAEDIC SURGERY

## 2023-05-05 PROCEDURE — 3700000001 HC ADD 15 MINUTES (ANESTHESIA): Performed by: ORTHOPAEDIC SURGERY

## 2023-05-05 PROCEDURE — 7100000001 HC PACU RECOVERY - ADDTL 15 MIN: Performed by: ORTHOPAEDIC SURGERY

## 2023-05-05 PROCEDURE — 2500000003 HC RX 250 WO HCPCS: Performed by: ORTHOPAEDIC SURGERY

## 2023-05-05 PROCEDURE — 6360000002 HC RX W HCPCS: Performed by: STUDENT IN AN ORGANIZED HEALTH CARE EDUCATION/TRAINING PROGRAM

## 2023-05-05 PROCEDURE — 6360000002 HC RX W HCPCS: Performed by: FAMILY MEDICINE

## 2023-05-05 PROCEDURE — 2580000003 HC RX 258: Performed by: ANESTHESIOLOGY

## 2023-05-05 PROCEDURE — 94640 AIRWAY INHALATION TREATMENT: CPT

## 2023-05-05 PROCEDURE — 0SBD0ZZ EXCISION OF LEFT KNEE JOINT, OPEN APPROACH: ICD-10-PCS | Performed by: ORTHOPAEDIC SURGERY

## 2023-05-05 PROCEDURE — G0378 HOSPITAL OBSERVATION PER HR: HCPCS

## 2023-05-05 PROCEDURE — 2580000003 HC RX 258: Performed by: FAMILY MEDICINE

## 2023-05-05 PROCEDURE — 2709999900 HC NON-CHARGEABLE SUPPLY: Performed by: ORTHOPAEDIC SURGERY

## 2023-05-05 PROCEDURE — 3700000000 HC ANESTHESIA ATTENDED CARE: Performed by: ORTHOPAEDIC SURGERY

## 2023-05-05 PROCEDURE — 6360000002 HC RX W HCPCS: Performed by: ORTHOPAEDIC SURGERY

## 2023-05-05 PROCEDURE — 3600000004 HC SURGERY LEVEL 4 BASE: Performed by: ORTHOPAEDIC SURGERY

## 2023-05-05 PROCEDURE — 7100000010 HC PHASE II RECOVERY - FIRST 15 MIN: Performed by: ORTHOPAEDIC SURGERY

## 2023-05-05 PROCEDURE — 6360000002 HC RX W HCPCS: Performed by: ANESTHESIOLOGY

## 2023-05-05 PROCEDURE — 6370000000 HC RX 637 (ALT 250 FOR IP): Performed by: NURSE PRACTITIONER

## 2023-05-05 PROCEDURE — 7100000011 HC PHASE II RECOVERY - ADDTL 15 MIN: Performed by: ORTHOPAEDIC SURGERY

## 2023-05-05 PROCEDURE — 3600000014 HC SURGERY LEVEL 4 ADDTL 15MIN: Performed by: ORTHOPAEDIC SURGERY

## 2023-05-05 PROCEDURE — 6370000000 HC RX 637 (ALT 250 FOR IP): Performed by: INTERNAL MEDICINE

## 2023-05-05 PROCEDURE — 3209999900 FLUORO FOR SURGICAL PROCEDURES

## 2023-05-05 PROCEDURE — 94150 VITAL CAPACITY TEST: CPT

## 2023-05-05 PROCEDURE — 6370000000 HC RX 637 (ALT 250 FOR IP): Performed by: ORTHOPAEDIC SURGERY

## 2023-05-05 PROCEDURE — 7100000000 HC PACU RECOVERY - FIRST 15 MIN: Performed by: ORTHOPAEDIC SURGERY

## 2023-05-05 PROCEDURE — 0SPD0JC REMOVAL OF SYNTHETIC SUBSTITUTE FROM LEFT KNEE JOINT, PATELLAR SURFACE, OPEN APPROACH: ICD-10-PCS | Performed by: ORTHOPAEDIC SURGERY

## 2023-05-05 RX ORDER — ALBUTEROL SULFATE 2.5 MG/3ML
2.5 SOLUTION RESPIRATORY (INHALATION)
Status: COMPLETED | OUTPATIENT
Start: 2023-05-05 | End: 2023-05-05

## 2023-05-05 RX ORDER — SODIUM CHLORIDE 9 MG/ML
INJECTION, SOLUTION INTRAVENOUS PRN
Status: DISCONTINUED | OUTPATIENT
Start: 2023-05-05 | End: 2023-05-08 | Stop reason: HOSPADM

## 2023-05-05 RX ORDER — DOCUSATE SODIUM 100 MG/1
100 CAPSULE, LIQUID FILLED ORAL 2 TIMES DAILY PRN
Qty: 60 CAPSULE | Refills: 0 | Status: SHIPPED | OUTPATIENT
Start: 2023-05-05 | End: 2023-06-04

## 2023-05-05 RX ORDER — PROPOFOL 10 MG/ML
INJECTION, EMULSION INTRAVENOUS PRN
Status: DISCONTINUED | OUTPATIENT
Start: 2023-05-05 | End: 2023-05-05 | Stop reason: SDUPTHER

## 2023-05-05 RX ORDER — MIDAZOLAM HYDROCHLORIDE 1 MG/ML
INJECTION INTRAMUSCULAR; INTRAVENOUS PRN
Status: DISCONTINUED | OUTPATIENT
Start: 2023-05-05 | End: 2023-05-05 | Stop reason: SDUPTHER

## 2023-05-05 RX ORDER — TRAMADOL HYDROCHLORIDE 50 MG/1
50 TABLET ORAL EVERY 4 HOURS PRN
Qty: 30 TABLET | Refills: 0 | Status: SHIPPED | OUTPATIENT
Start: 2023-05-05 | End: 2023-05-12

## 2023-05-05 RX ORDER — AMLODIPINE BESYLATE 5 MG/1
5 TABLET ORAL DAILY
Status: DISCONTINUED | OUTPATIENT
Start: 2023-05-06 | End: 2023-05-08 | Stop reason: HOSPADM

## 2023-05-05 RX ORDER — ONDANSETRON 2 MG/ML
4 INJECTION INTRAMUSCULAR; INTRAVENOUS EVERY 6 HOURS PRN
Status: DISCONTINUED | OUTPATIENT
Start: 2023-05-05 | End: 2023-05-08 | Stop reason: HOSPADM

## 2023-05-05 RX ORDER — DEXAMETHASONE SODIUM PHOSPHATE 4 MG/ML
INJECTION, SOLUTION INTRA-ARTICULAR; INTRALESIONAL; INTRAMUSCULAR; INTRAVENOUS; SOFT TISSUE PRN
Status: DISCONTINUED | OUTPATIENT
Start: 2023-05-05 | End: 2023-05-05 | Stop reason: SDUPTHER

## 2023-05-05 RX ORDER — PANTOPRAZOLE SODIUM 40 MG/1
40 TABLET, DELAYED RELEASE ORAL
Status: DISCONTINUED | OUTPATIENT
Start: 2023-05-06 | End: 2023-05-08 | Stop reason: HOSPADM

## 2023-05-05 RX ORDER — IPRATROPIUM BROMIDE AND ALBUTEROL SULFATE 2.5; .5 MG/3ML; MG/3ML
1 SOLUTION RESPIRATORY (INHALATION) EVERY 4 HOURS PRN
Status: DISCONTINUED | OUTPATIENT
Start: 2023-05-05 | End: 2023-05-05

## 2023-05-05 RX ORDER — FENTANYL CITRATE 0.05 MG/ML
50 INJECTION, SOLUTION INTRAMUSCULAR; INTRAVENOUS EVERY 5 MIN PRN
Status: DISCONTINUED | OUTPATIENT
Start: 2023-05-05 | End: 2023-05-05 | Stop reason: HOSPADM

## 2023-05-05 RX ORDER — CEFAZOLIN SODIUM IN 0.9 % NACL 2 G/100 ML
2000 PLASTIC BAG, INJECTION (ML) INTRAVENOUS EVERY 8 HOURS
Status: COMPLETED | OUTPATIENT
Start: 2023-05-05 | End: 2023-05-06

## 2023-05-05 RX ORDER — SODIUM CHLORIDE 0.9 % (FLUSH) 0.9 %
5-40 SYRINGE (ML) INJECTION EVERY 12 HOURS SCHEDULED
Status: DISCONTINUED | OUTPATIENT
Start: 2023-05-05 | End: 2023-05-08 | Stop reason: HOSPADM

## 2023-05-05 RX ORDER — MEPERIDINE HYDROCHLORIDE 25 MG/ML
25 INJECTION INTRAMUSCULAR; INTRAVENOUS; SUBCUTANEOUS EVERY 4 HOURS PRN
Status: DISCONTINUED | OUTPATIENT
Start: 2023-05-05 | End: 2023-05-05

## 2023-05-05 RX ORDER — ONDANSETRON 2 MG/ML
4 INJECTION INTRAMUSCULAR; INTRAVENOUS
Status: DISCONTINUED | OUTPATIENT
Start: 2023-05-05 | End: 2023-05-05 | Stop reason: HOSPADM

## 2023-05-05 RX ORDER — IPRATROPIUM BROMIDE AND ALBUTEROL SULFATE 2.5; .5 MG/3ML; MG/3ML
1 SOLUTION RESPIRATORY (INHALATION) 3 TIMES DAILY
Status: DISCONTINUED | OUTPATIENT
Start: 2023-05-06 | End: 2023-05-08 | Stop reason: HOSPADM

## 2023-05-05 RX ORDER — FENTANYL CITRATE 50 UG/ML
INJECTION, SOLUTION INTRAMUSCULAR; INTRAVENOUS PRN
Status: DISCONTINUED | OUTPATIENT
Start: 2023-05-05 | End: 2023-05-05 | Stop reason: SDUPTHER

## 2023-05-05 RX ORDER — LIDOCAINE HYDROCHLORIDE AND EPINEPHRINE 10; 10 MG/ML; UG/ML
INJECTION, SOLUTION INFILTRATION; PERINEURAL PRN
Status: DISCONTINUED | OUTPATIENT
Start: 2023-05-05 | End: 2023-05-05 | Stop reason: ALTCHOICE

## 2023-05-05 RX ORDER — ONDANSETRON 4 MG/1
4 TABLET, ORALLY DISINTEGRATING ORAL EVERY 8 HOURS PRN
Status: DISCONTINUED | OUTPATIENT
Start: 2023-05-05 | End: 2023-05-08 | Stop reason: HOSPADM

## 2023-05-05 RX ORDER — ONDANSETRON 2 MG/ML
INJECTION INTRAMUSCULAR; INTRAVENOUS PRN
Status: DISCONTINUED | OUTPATIENT
Start: 2023-05-05 | End: 2023-05-05 | Stop reason: SDUPTHER

## 2023-05-05 RX ORDER — SODIUM CHLORIDE 9 MG/ML
INJECTION, SOLUTION INTRAVENOUS PRN
Status: DISCONTINUED | OUTPATIENT
Start: 2023-05-05 | End: 2023-05-05 | Stop reason: HOSPADM

## 2023-05-05 RX ORDER — CEFAZOLIN SODIUM IN 0.9 % NACL 2 G/100 ML
2000 PLASTIC BAG, INJECTION (ML) INTRAVENOUS ONCE
Status: COMPLETED | OUTPATIENT
Start: 2023-05-05 | End: 2023-05-05

## 2023-05-05 RX ORDER — SODIUM CHLORIDE 0.9 % (FLUSH) 0.9 %
5-40 SYRINGE (ML) INJECTION PRN
Status: DISCONTINUED | OUTPATIENT
Start: 2023-05-05 | End: 2023-05-08 | Stop reason: HOSPADM

## 2023-05-05 RX ORDER — SODIUM CHLORIDE 0.9 % (FLUSH) 0.9 %
5-40 SYRINGE (ML) INJECTION EVERY 12 HOURS SCHEDULED
Status: DISCONTINUED | OUTPATIENT
Start: 2023-05-05 | End: 2023-05-05 | Stop reason: HOSPADM

## 2023-05-05 RX ORDER — SODIUM CHLORIDE 9 MG/ML
INJECTION, SOLUTION INTRAVENOUS PRN
Status: DISCONTINUED | OUTPATIENT
Start: 2023-05-05 | End: 2023-05-05

## 2023-05-05 RX ORDER — DIPHENHYDRAMINE HYDROCHLORIDE 50 MG/ML
25 INJECTION INTRAMUSCULAR; INTRAVENOUS ONCE
Status: DISCONTINUED | OUTPATIENT
Start: 2023-05-05 | End: 2023-05-05

## 2023-05-05 RX ORDER — SODIUM CHLORIDE, SODIUM LACTATE, POTASSIUM CHLORIDE, CALCIUM CHLORIDE 600; 310; 30; 20 MG/100ML; MG/100ML; MG/100ML; MG/100ML
INJECTION, SOLUTION INTRAVENOUS CONTINUOUS PRN
Status: DISCONTINUED | OUTPATIENT
Start: 2023-05-05 | End: 2023-05-05 | Stop reason: SDUPTHER

## 2023-05-05 RX ORDER — DIPHENHYDRAMINE HYDROCHLORIDE 50 MG/ML
12.5 INJECTION INTRAMUSCULAR; INTRAVENOUS
Status: DISCONTINUED | OUTPATIENT
Start: 2023-05-05 | End: 2023-05-05 | Stop reason: HOSPADM

## 2023-05-05 RX ORDER — ACETAMINOPHEN 500 MG
1000 TABLET ORAL EVERY 8 HOURS SCHEDULED
Qty: 180 TABLET | Refills: 0 | Status: SHIPPED | OUTPATIENT
Start: 2023-05-05 | End: 2023-06-04

## 2023-05-05 RX ORDER — ALBUTEROL SULFATE 1.25 MG/3ML
1.25 SOLUTION RESPIRATORY (INHALATION) EVERY 6 HOURS PRN
Status: DISCONTINUED | OUTPATIENT
Start: 2023-05-05 | End: 2023-05-08 | Stop reason: HOSPADM

## 2023-05-05 RX ORDER — HYDROCHLOROTHIAZIDE 25 MG/1
12.5 TABLET ORAL DAILY
Status: DISCONTINUED | OUTPATIENT
Start: 2023-05-06 | End: 2023-05-08 | Stop reason: HOSPADM

## 2023-05-05 RX ORDER — MEPERIDINE HYDROCHLORIDE 25 MG/ML
12.5 INJECTION INTRAMUSCULAR; INTRAVENOUS; SUBCUTANEOUS EVERY 10 MIN PRN
Status: DISCONTINUED | OUTPATIENT
Start: 2023-05-05 | End: 2023-05-05 | Stop reason: HOSPADM

## 2023-05-05 RX ORDER — OXYCODONE HYDROCHLORIDE 5 MG/1
5 TABLET ORAL EVERY 4 HOURS PRN
Status: DISCONTINUED | OUTPATIENT
Start: 2023-05-05 | End: 2023-05-08 | Stop reason: HOSPADM

## 2023-05-05 RX ORDER — SODIUM CHLORIDE, SODIUM LACTATE, POTASSIUM CHLORIDE, CALCIUM CHLORIDE 600; 310; 30; 20 MG/100ML; MG/100ML; MG/100ML; MG/100ML
INJECTION, SOLUTION INTRAVENOUS CONTINUOUS
Status: DISCONTINUED | OUTPATIENT
Start: 2023-05-05 | End: 2023-05-05 | Stop reason: HOSPADM

## 2023-05-05 RX ORDER — ATORVASTATIN CALCIUM 40 MG/1
40 TABLET, FILM COATED ORAL NIGHTLY
Status: DISCONTINUED | OUTPATIENT
Start: 2023-05-05 | End: 2023-05-08 | Stop reason: HOSPADM

## 2023-05-05 RX ORDER — SODIUM CHLORIDE 0.9 % (FLUSH) 0.9 %
5-40 SYRINGE (ML) INJECTION PRN
Status: DISCONTINUED | OUTPATIENT
Start: 2023-05-05 | End: 2023-05-05 | Stop reason: HOSPADM

## 2023-05-05 RX ORDER — FENTANYL CITRATE 0.05 MG/ML
25 INJECTION, SOLUTION INTRAMUSCULAR; INTRAVENOUS EVERY 5 MIN PRN
Status: DISCONTINUED | OUTPATIENT
Start: 2023-05-05 | End: 2023-05-05 | Stop reason: HOSPADM

## 2023-05-05 RX ORDER — LIDOCAINE HYDROCHLORIDE 10 MG/ML
1 INJECTION, SOLUTION EPIDURAL; INFILTRATION; INTRACAUDAL; PERINEURAL
Status: DISCONTINUED | OUTPATIENT
Start: 2023-05-05 | End: 2023-05-05 | Stop reason: HOSPADM

## 2023-05-05 RX ORDER — MAGNESIUM HYDROXIDE 1200 MG/15ML
LIQUID ORAL CONTINUOUS PRN
Status: COMPLETED | OUTPATIENT
Start: 2023-05-05 | End: 2023-05-05

## 2023-05-05 RX ORDER — LOSARTAN POTASSIUM 25 MG/1
50 TABLET ORAL DAILY
Status: DISCONTINUED | OUTPATIENT
Start: 2023-05-06 | End: 2023-05-08 | Stop reason: HOSPADM

## 2023-05-05 RX ORDER — ACETAMINOPHEN 325 MG/1
650 TABLET ORAL EVERY 6 HOURS
Status: DISCONTINUED | OUTPATIENT
Start: 2023-05-05 | End: 2023-05-08 | Stop reason: HOSPADM

## 2023-05-05 RX ORDER — LIDOCAINE HYDROCHLORIDE 20 MG/ML
INJECTION, SOLUTION INTRAVENOUS PRN
Status: DISCONTINUED | OUTPATIENT
Start: 2023-05-05 | End: 2023-05-05 | Stop reason: SDUPTHER

## 2023-05-05 RX ORDER — MELOXICAM 7.5 MG/1
3.75 TABLET ORAL DAILY
Status: DISPENSED | OUTPATIENT
Start: 2023-05-05 | End: 2023-05-08

## 2023-05-05 RX ORDER — ASPIRIN 81 MG/1
81 TABLET ORAL 2 TIMES DAILY
Qty: 70 TABLET | Refills: 0 | Status: SHIPPED | OUTPATIENT
Start: 2023-05-05 | End: 2023-06-09

## 2023-05-05 RX ORDER — ASPIRIN 81 MG/1
81 TABLET ORAL 2 TIMES DAILY
Status: DISCONTINUED | OUTPATIENT
Start: 2023-05-06 | End: 2023-05-08 | Stop reason: HOSPADM

## 2023-05-05 RX ADMIN — LIDOCAINE HYDROCHLORIDE 60 MG: 20 INJECTION, SOLUTION INTRAVENOUS at 11:47

## 2023-05-05 RX ADMIN — FENTANYL CITRATE 25 MCG: 50 INJECTION, SOLUTION INTRAMUSCULAR; INTRAVENOUS at 12:11

## 2023-05-05 RX ADMIN — Medication 2000 MG: at 21:04

## 2023-05-05 RX ADMIN — IPRATROPIUM BROMIDE AND ALBUTEROL SULFATE 1 AMPULE: .5; 2.5 SOLUTION RESPIRATORY (INHALATION) at 20:06

## 2023-05-05 RX ADMIN — FENTANYL CITRATE 50 MCG: 50 INJECTION, SOLUTION INTRAMUSCULAR; INTRAVENOUS at 12:50

## 2023-05-05 RX ADMIN — MEPERIDINE HYDROCHLORIDE 12.5 MG: 25 INJECTION, SOLUTION INTRAMUSCULAR; INTRAVENOUS; SUBCUTANEOUS at 14:11

## 2023-05-05 RX ADMIN — SODIUM CHLORIDE, POTASSIUM CHLORIDE, SODIUM LACTATE AND CALCIUM CHLORIDE: 600; 310; 30; 20 INJECTION, SOLUTION INTRAVENOUS at 12:30

## 2023-05-05 RX ADMIN — DEXAMETHASONE SODIUM PHOSPHATE 4 MG: 4 INJECTION, SOLUTION INTRAMUSCULAR; INTRAVENOUS at 11:55

## 2023-05-05 RX ADMIN — PROPOFOL 150 MG: 10 INJECTION, EMULSION INTRAVENOUS at 11:47

## 2023-05-05 RX ADMIN — Medication 10 ML: at 21:06

## 2023-05-05 RX ADMIN — ONDANSETRON 4 MG: 2 INJECTION INTRAMUSCULAR; INTRAVENOUS at 12:30

## 2023-05-05 RX ADMIN — ACETAMINOPHEN 650 MG: 325 TABLET ORAL at 21:06

## 2023-05-05 RX ADMIN — Medication 2000 MG: at 11:54

## 2023-05-05 RX ADMIN — MIDAZOLAM HYDROCHLORIDE 2 MG: 1 INJECTION, SOLUTION INTRAMUSCULAR; INTRAVENOUS at 11:39

## 2023-05-05 RX ADMIN — PHENYLEPHRINE HYDROCHLORIDE 100 MCG: 10 INJECTION INTRAVENOUS at 12:24

## 2023-05-05 RX ADMIN — PHENYLEPHRINE HYDROCHLORIDE 100 MCG: 10 INJECTION INTRAVENOUS at 12:26

## 2023-05-05 RX ADMIN — ALBUTEROL SULFATE 2.5 MG: 2.5 SOLUTION RESPIRATORY (INHALATION) at 15:41

## 2023-05-05 RX ADMIN — SODIUM CHLORIDE, POTASSIUM CHLORIDE, SODIUM LACTATE AND CALCIUM CHLORIDE 1 ML/HR: 600; 310; 30; 20 INJECTION, SOLUTION INTRAVENOUS at 09:18

## 2023-05-05 RX ADMIN — MELOXICAM 3.75 MG: 7.5 TABLET ORAL at 21:06

## 2023-05-05 RX ADMIN — SODIUM CHLORIDE, POTASSIUM CHLORIDE, SODIUM LACTATE AND CALCIUM CHLORIDE: 600; 310; 30; 20 INJECTION, SOLUTION INTRAVENOUS at 11:40

## 2023-05-05 RX ADMIN — FENTANYL CITRATE 25 MCG: 50 INJECTION, SOLUTION INTRAMUSCULAR; INTRAVENOUS at 12:15

## 2023-05-05 RX ADMIN — ATORVASTATIN CALCIUM 40 MG: 40 TABLET, FILM COATED ORAL at 21:06

## 2023-05-05 RX ADMIN — MEPERIDINE HYDROCHLORIDE 12.5 MG: 25 INJECTION, SOLUTION INTRAMUSCULAR; INTRAVENOUS; SUBCUTANEOUS at 13:44

## 2023-05-05 RX ADMIN — Medication 10 ML: at 21:08

## 2023-05-05 RX ADMIN — PHENYLEPHRINE HYDROCHLORIDE 100 MCG: 10 INJECTION INTRAVENOUS at 12:07

## 2023-05-05 ASSESSMENT — PAIN DESCRIPTION - LOCATION
LOCATION: KNEE

## 2023-05-05 ASSESSMENT — PAIN SCALES - GENERAL
PAINLEVEL_OUTOF10: 5
PAINLEVEL_OUTOF10: 5
PAINLEVEL_OUTOF10: 0
PAINLEVEL_OUTOF10: 0
PAINLEVEL_OUTOF10: 2
PAINLEVEL_OUTOF10: 4
PAINLEVEL_OUTOF10: 2
PAINLEVEL_OUTOF10: 0

## 2023-05-05 ASSESSMENT — PAIN DESCRIPTION - DESCRIPTORS
DESCRIPTORS: ACHING;DULL
DESCRIPTORS: SHARP;BURNING
DESCRIPTORS: DULL
DESCRIPTORS: ACHING;DULL
DESCRIPTORS: BURNING;SHARP
DESCRIPTORS: ACHING;DULL

## 2023-05-05 ASSESSMENT — PAIN DESCRIPTION - FREQUENCY
FREQUENCY: CONTINUOUS

## 2023-05-05 ASSESSMENT — PAIN DESCRIPTION - ORIENTATION
ORIENTATION: MID;LEFT
ORIENTATION: LEFT;UPPER
ORIENTATION: LEFT;MID
ORIENTATION: LEFT;UPPER
ORIENTATION: LEFT;MID
ORIENTATION: LEFT;UPPER
ORIENTATION: LEFT;UPPER

## 2023-05-05 ASSESSMENT — PAIN DESCRIPTION - ONSET
ONSET: PROGRESSIVE
ONSET: GRADUAL
ONSET: PROGRESSIVE
ONSET: GRADUAL

## 2023-05-05 ASSESSMENT — PAIN DESCRIPTION - PAIN TYPE
TYPE: SURGICAL PAIN

## 2023-05-05 ASSESSMENT — PAIN - FUNCTIONAL ASSESSMENT
PAIN_FUNCTIONAL_ASSESSMENT: PREVENTS OR INTERFERES WITH MANY ACTIVE NOT PASSIVE ACTIVITIES
PAIN_FUNCTIONAL_ASSESSMENT: PREVENTS OR INTERFERES WITH MANY ACTIVE NOT PASSIVE ACTIVITIES
PAIN_FUNCTIONAL_ASSESSMENT: NONE - DENIES PAIN

## 2023-05-05 NOTE — ANESTHESIA POSTPROCEDURE EVALUATION
Department of Anesthesiology  Postprocedure Note    Patient: Chad Higgins  MRN: [id number]  YOB: 1945  Date of evaluation: 5/5/2023      Procedure Summary     Date: 05/05/23 Room / Location: [facility] OR 09 / [facility]    Anesthesia Start: 1140 Anesthesia Stop:     Procedure: REMOVAL OF DEEP HARDWARE LEFT  PATELLA/KNEE LEFT KNEE ARTHROSCOPY WITH  EXTENSIVE SYNOVECTOMY OF ADHESION/ MANIPULATION UNDER ANESTHESIA (Left: Knee) Diagnosis:       Arthrofibrosis of knee joint, left      Closed nondisplaced transverse fracture of left patella, initial encounter      (Arthrofibrosis of knee joint, left [M24.662])      (Closed nondisplaced transverse fracture of left patella, initial encounter [U88.937K])    Surgeons: Sanjay Sosa MD Responsible Provider: Kamlesh Joseph DO    Anesthesia Type: general ASA Status: 3          Anesthesia Type: No value filed.     Shade Phase I: Shade Score: 10    Shade Phase II:        Anesthesia Post Evaluation    Patient location during evaluation: PACU  Patient participation: complete - patient participated  Level of consciousness: awake  Pain score: 0  Airway patency: patent  Nausea & Vomiting: no nausea  Complications: no  Cardiovascular status: hemodynamically stable and blood pressure returned to baseline  Respiratory status: acceptable, face mask and nasal cannula  Hydration status: euvolemic

## 2023-05-06 ENCOUNTER — APPOINTMENT (OUTPATIENT)
Dept: GENERAL RADIOLOGY | Age: 78
DRG: 982 | End: 2023-05-06
Attending: ORTHOPAEDIC SURGERY
Payer: COMMERCIAL

## 2023-05-06 PROCEDURE — 2700000000 HC OXYGEN THERAPY PER DAY

## 2023-05-06 PROCEDURE — 71045 X-RAY EXAM CHEST 1 VIEW: CPT

## 2023-05-06 PROCEDURE — G0378 HOSPITAL OBSERVATION PER HR: HCPCS

## 2023-05-06 PROCEDURE — 94761 N-INVAS EAR/PLS OXIMETRY MLT: CPT

## 2023-05-06 PROCEDURE — 6370000000 HC RX 637 (ALT 250 FOR IP): Performed by: INTERNAL MEDICINE

## 2023-05-06 PROCEDURE — 6360000002 HC RX W HCPCS: Performed by: ORTHOPAEDIC SURGERY

## 2023-05-06 PROCEDURE — 94640 AIRWAY INHALATION TREATMENT: CPT

## 2023-05-06 PROCEDURE — 97162 PT EVAL MOD COMPLEX 30 MIN: CPT

## 2023-05-06 PROCEDURE — 6370000000 HC RX 637 (ALT 250 FOR IP): Performed by: NURSE PRACTITIONER

## 2023-05-06 PROCEDURE — 6370000000 HC RX 637 (ALT 250 FOR IP): Performed by: ORTHOPAEDIC SURGERY

## 2023-05-06 PROCEDURE — 2580000003 HC RX 258: Performed by: ORTHOPAEDIC SURGERY

## 2023-05-06 RX ADMIN — ATORVASTATIN CALCIUM 40 MG: 40 TABLET, FILM COATED ORAL at 20:29

## 2023-05-06 RX ADMIN — ASPIRIN 81 MG: 81 TABLET, COATED ORAL at 11:58

## 2023-05-06 RX ADMIN — IPRATROPIUM BROMIDE AND ALBUTEROL SULFATE 1 AMPULE: .5; 2.5 SOLUTION RESPIRATORY (INHALATION) at 07:41

## 2023-05-06 RX ADMIN — ACETAMINOPHEN 650 MG: 325 TABLET ORAL at 14:50

## 2023-05-06 RX ADMIN — Medication 10 ML: at 21:01

## 2023-05-06 RX ADMIN — Medication 8 ML: at 12:07

## 2023-05-06 RX ADMIN — ACETAMINOPHEN 650 MG: 325 TABLET ORAL at 01:57

## 2023-05-06 RX ADMIN — ASPIRIN 81 MG: 81 TABLET, COATED ORAL at 20:29

## 2023-05-06 RX ADMIN — OXYCODONE 5 MG: 5 TABLET ORAL at 11:58

## 2023-05-06 RX ADMIN — PANTOPRAZOLE SODIUM 40 MG: 40 TABLET, DELAYED RELEASE ORAL at 05:09

## 2023-05-06 RX ADMIN — OXYCODONE 5 MG: 5 TABLET ORAL at 16:37

## 2023-05-06 RX ADMIN — ACETAMINOPHEN 650 MG: 325 TABLET ORAL at 19:09

## 2023-05-06 RX ADMIN — IPRATROPIUM BROMIDE AND ALBUTEROL SULFATE 1 AMPULE: .5; 2.5 SOLUTION RESPIRATORY (INHALATION) at 20:05

## 2023-05-06 RX ADMIN — Medication 2000 MG: at 05:09

## 2023-05-06 RX ADMIN — LEVOTHYROXINE SODIUM 125 MCG: 0.03 TABLET ORAL at 05:09

## 2023-05-06 RX ADMIN — IPRATROPIUM BROMIDE AND ALBUTEROL SULFATE 1 AMPULE: .5; 2.5 SOLUTION RESPIRATORY (INHALATION) at 13:43

## 2023-05-06 ASSESSMENT — PAIN DESCRIPTION - LOCATION: LOCATION: KNEE

## 2023-05-06 ASSESSMENT — PAIN DESCRIPTION - ORIENTATION: ORIENTATION: LEFT

## 2023-05-07 ENCOUNTER — APPOINTMENT (OUTPATIENT)
Dept: CT IMAGING | Age: 78
DRG: 982 | End: 2023-05-07
Attending: ORTHOPAEDIC SURGERY
Payer: COMMERCIAL

## 2023-05-07 LAB
ANION GAP SERPL CALCULATED.3IONS-SCNC: 10 MEQ/L (ref 9–15)
BASOPHILS # BLD: 0 K/UL (ref 0–0.2)
BASOPHILS NFR BLD: 0.5 %
BUN SERPL-MCNC: 14 MG/DL (ref 8–23)
CALCIUM SERPL-MCNC: 8.5 MG/DL (ref 8.5–9.9)
CHLORIDE SERPL-SCNC: 101 MEQ/L (ref 95–107)
CO2 SERPL-SCNC: 26 MEQ/L (ref 20–31)
CREAT SERPL-MCNC: 0.9 MG/DL (ref 0.5–0.9)
EOSINOPHIL # BLD: 0.3 K/UL (ref 0–0.7)
EOSINOPHIL NFR BLD: 3.3 %
ERYTHROCYTE [DISTWIDTH] IN BLOOD BY AUTOMATED COUNT: 14.4 % (ref 11.5–14.5)
GLUCOSE SERPL-MCNC: 193 MG/DL (ref 70–99)
HCT VFR BLD AUTO: 26.9 % (ref 37–47)
HGB BLD-MCNC: 9 G/DL (ref 12–16)
LYMPHOCYTES # BLD: 1.3 K/UL (ref 1–4.8)
LYMPHOCYTES NFR BLD: 15.8 %
MCH RBC QN AUTO: 29 PG (ref 27–31.3)
MCHC RBC AUTO-ENTMCNC: 33.6 % (ref 33–37)
MCV RBC AUTO: 86.2 FL (ref 79.4–94.8)
MONOCYTES # BLD: 1.3 K/UL (ref 0.2–0.8)
MONOCYTES NFR BLD: 14.8 %
NEUTROPHILS # BLD: 5.6 K/UL (ref 1.4–6.5)
NEUTS SEG NFR BLD: 65.6 %
PLATELET # BLD AUTO: 191 K/UL (ref 130–400)
POTASSIUM SERPL-SCNC: 3.8 MEQ/L (ref 3.4–4.9)
RBC # BLD AUTO: 3.12 M/UL (ref 4.2–5.4)
SODIUM SERPL-SCNC: 137 MEQ/L (ref 135–144)
WBC # BLD AUTO: 8.5 K/UL (ref 4.8–10.8)

## 2023-05-07 PROCEDURE — 36415 COLL VENOUS BLD VENIPUNCTURE: CPT

## 2023-05-07 PROCEDURE — 6370000000 HC RX 637 (ALT 250 FOR IP): Performed by: NURSE PRACTITIONER

## 2023-05-07 PROCEDURE — 94640 AIRWAY INHALATION TREATMENT: CPT

## 2023-05-07 PROCEDURE — 94761 N-INVAS EAR/PLS OXIMETRY MLT: CPT

## 2023-05-07 PROCEDURE — 2700000000 HC OXYGEN THERAPY PER DAY

## 2023-05-07 PROCEDURE — 6370000000 HC RX 637 (ALT 250 FOR IP): Performed by: ORTHOPAEDIC SURGERY

## 2023-05-07 PROCEDURE — 80048 BASIC METABOLIC PNL TOTAL CA: CPT

## 2023-05-07 PROCEDURE — 2580000003 HC RX 258: Performed by: ORTHOPAEDIC SURGERY

## 2023-05-07 PROCEDURE — 97116 GAIT TRAINING THERAPY: CPT

## 2023-05-07 PROCEDURE — 85025 COMPLETE CBC W/AUTO DIFF WBC: CPT

## 2023-05-07 PROCEDURE — 6370000000 HC RX 637 (ALT 250 FOR IP): Performed by: INTERNAL MEDICINE

## 2023-05-07 PROCEDURE — 1210000000 HC MED SURG R&B

## 2023-05-07 PROCEDURE — 71275 CT ANGIOGRAPHY CHEST: CPT

## 2023-05-07 PROCEDURE — 6360000004 HC RX CONTRAST MEDICATION: Performed by: FAMILY MEDICINE

## 2023-05-07 RX ADMIN — Medication 10 ML: at 08:34

## 2023-05-07 RX ADMIN — IPRATROPIUM BROMIDE AND ALBUTEROL SULFATE 1 AMPULE: .5; 2.5 SOLUTION RESPIRATORY (INHALATION) at 14:42

## 2023-05-07 RX ADMIN — ACETAMINOPHEN 650 MG: 325 TABLET ORAL at 15:05

## 2023-05-07 RX ADMIN — Medication 10 ML: at 20:27

## 2023-05-07 RX ADMIN — IOPAMIDOL 75 ML: 612 INJECTION, SOLUTION INTRAVENOUS at 21:39

## 2023-05-07 RX ADMIN — ASPIRIN 81 MG: 81 TABLET, COATED ORAL at 19:53

## 2023-05-07 RX ADMIN — MELOXICAM 3.75 MG: 7.5 TABLET ORAL at 08:30

## 2023-05-07 RX ADMIN — HYDROCHLOROTHIAZIDE 12.5 MG: 25 TABLET ORAL at 08:30

## 2023-05-07 RX ADMIN — ATORVASTATIN CALCIUM 40 MG: 40 TABLET, FILM COATED ORAL at 19:53

## 2023-05-07 RX ADMIN — OXYCODONE 5 MG: 5 TABLET ORAL at 02:55

## 2023-05-07 RX ADMIN — IPRATROPIUM BROMIDE AND ALBUTEROL SULFATE 1 AMPULE: .5; 2.5 SOLUTION RESPIRATORY (INHALATION) at 19:57

## 2023-05-07 RX ADMIN — LOSARTAN POTASSIUM 50 MG: 25 TABLET, FILM COATED ORAL at 08:32

## 2023-05-07 RX ADMIN — ACETAMINOPHEN 650 MG: 325 TABLET ORAL at 06:01

## 2023-05-07 RX ADMIN — AMLODIPINE BESYLATE 5 MG: 5 TABLET ORAL at 08:31

## 2023-05-07 RX ADMIN — IPRATROPIUM BROMIDE AND ALBUTEROL SULFATE 1 AMPULE: .5; 2.5 SOLUTION RESPIRATORY (INHALATION) at 07:39

## 2023-05-07 RX ADMIN — LEVOTHYROXINE SODIUM 125 MCG: 0.03 TABLET ORAL at 06:01

## 2023-05-07 RX ADMIN — PANTOPRAZOLE SODIUM 40 MG: 40 TABLET, DELAYED RELEASE ORAL at 06:01

## 2023-05-07 RX ADMIN — ACETAMINOPHEN 650 MG: 325 TABLET ORAL at 19:53

## 2023-05-07 RX ADMIN — OXYCODONE 5 MG: 5 TABLET ORAL at 15:03

## 2023-05-07 RX ADMIN — ASPIRIN 81 MG: 81 TABLET, COATED ORAL at 08:30

## 2023-05-07 RX ADMIN — OXYCODONE 5 MG: 5 TABLET ORAL at 08:30

## 2023-05-07 ASSESSMENT — PAIN SCALES - GENERAL
PAINLEVEL_OUTOF10: 6
PAINLEVEL_OUTOF10: 6
PAINLEVEL_OUTOF10: 7
PAINLEVEL_OUTOF10: 5
PAINLEVEL_OUTOF10: 7
PAINLEVEL_OUTOF10: 4

## 2023-05-07 ASSESSMENT — PAIN DESCRIPTION - ORIENTATION: ORIENTATION: LEFT

## 2023-05-07 ASSESSMENT — PAIN DESCRIPTION - LOCATION
LOCATION: LEG;KNEE
LOCATION: KNEE

## 2023-05-07 ASSESSMENT — PAIN DESCRIPTION - PAIN TYPE: TYPE: SURGICAL PAIN

## 2023-05-08 VITALS
DIASTOLIC BLOOD PRESSURE: 72 MMHG | SYSTOLIC BLOOD PRESSURE: 145 MMHG | TEMPERATURE: 98.2 F | RESPIRATION RATE: 16 BRPM | HEART RATE: 77 BPM | OXYGEN SATURATION: 97 %

## 2023-05-08 PROCEDURE — 6370000000 HC RX 637 (ALT 250 FOR IP): Performed by: NURSE PRACTITIONER

## 2023-05-08 PROCEDURE — 6370000000 HC RX 637 (ALT 250 FOR IP): Performed by: ORTHOPAEDIC SURGERY

## 2023-05-08 PROCEDURE — 97116 GAIT TRAINING THERAPY: CPT

## 2023-05-08 PROCEDURE — 6370000000 HC RX 637 (ALT 250 FOR IP): Performed by: INTERNAL MEDICINE

## 2023-05-08 PROCEDURE — 94640 AIRWAY INHALATION TREATMENT: CPT

## 2023-05-08 PROCEDURE — 94664 DEMO&/EVAL PT USE INHALER: CPT

## 2023-05-08 PROCEDURE — 2700000000 HC OXYGEN THERAPY PER DAY

## 2023-05-08 PROCEDURE — 94761 N-INVAS EAR/PLS OXIMETRY MLT: CPT

## 2023-05-08 RX ADMIN — IPRATROPIUM BROMIDE AND ALBUTEROL SULFATE 1 AMPULE: .5; 2.5 SOLUTION RESPIRATORY (INHALATION) at 07:56

## 2023-05-08 RX ADMIN — AMLODIPINE BESYLATE 5 MG: 5 TABLET ORAL at 08:26

## 2023-05-08 RX ADMIN — PANTOPRAZOLE SODIUM 40 MG: 40 TABLET, DELAYED RELEASE ORAL at 06:21

## 2023-05-08 RX ADMIN — ASPIRIN 81 MG: 81 TABLET, COATED ORAL at 08:26

## 2023-05-08 RX ADMIN — LEVOTHYROXINE SODIUM 125 MCG: 0.03 TABLET ORAL at 06:21

## 2023-05-08 RX ADMIN — IPRATROPIUM BROMIDE AND ALBUTEROL SULFATE 1 AMPULE: .5; 2.5 SOLUTION RESPIRATORY (INHALATION) at 11:08

## 2023-05-08 RX ADMIN — HYDROCHLOROTHIAZIDE 12.5 MG: 25 TABLET ORAL at 08:26

## 2023-05-08 RX ADMIN — LOSARTAN POTASSIUM 50 MG: 25 TABLET, FILM COATED ORAL at 08:26

## 2023-05-08 RX ADMIN — ACETAMINOPHEN 650 MG: 325 TABLET ORAL at 06:21

## 2023-05-08 RX ADMIN — OXYCODONE 5 MG: 5 TABLET ORAL at 08:38

## 2023-05-08 NOTE — FLOWSHEET NOTE
Pt has been awake in her room all morning has the ice bucket going dressing on left knee is intact and has some drainage that is old. Pt has kept ice on of most of shift.   Pt has order to be discharge and IV has been removed X2 Pt waiting for  who is burying his brother and will be here to get her after burial.

## 2023-05-08 NOTE — DISCHARGE SUMMARY
[facility] HOSPITAL Medicine Discharge Summary    Juanito Liz  :  1945  MRN:  [id number]    Admit date:  2023  Discharge date:  2023    Admitting Physician:  Arcenio Soto MD  Primary Care Physician:  Jorge Vasquez DO    Discharge Diagnoses:    Principal Problem:    Hypoxia  Active Problems:    Acute respiratory failure with hypoxia [facility])  Resolved Problems:    * No resolved hospital problems. *    No chief complaint on file. Condition: improved  Activity: per Orthopaedic team  Diet: regular  Disposition: home  Functional Status: ambulatory with assistance    Significant Findings:     CTA Chest:  No evidence of pulmonary embolism or acute pulmonary abnormality. Atelectatic changes seen lung bases bilaterally. No focal infiltrate present. Hospital Course:   27-year-old female was admitted for elective removal of deep hardware of left patella and postoperative course was complicated by hypoxia related to atelectasis. This ultimately improved and she was stable on room air on .       Exam on Discharge:   BP (!) 145/72   Pulse 77   Temp 98.2 °F (36.8 °C) (Oral)   Resp 16   SpO2 97%   General appearance: alert, cooperative and no distress  Mental Status: oriented to person, place and time and normal affect  Lungs: clear to auscultation bilaterally, normal effort  Heart: regular rate and rhythm, no murmur  Abdomen: soft, nontender, nondistended, bowel sounds present, no masses  Extremities: no edema, redness, tenderness in the calves  Skin: no gross lesions, rashes    Discharge Medication List:     Medication List        START taking these medications      acetaminophen 500 MG tablet  Commonly known as: TYLENOL  Take 2 tablets by mouth every 8 hours     docusate sodium 100 MG capsule  Commonly known as: COLACE  Take 1 capsule by mouth 2 times daily as needed for Constipation     traMADol 50 MG tablet  Commonly known as: Ultram  Take 1 tablet by mouth every 4 hours as

## 2023-05-08 NOTE — CARE COORDINATION
Met with pt at bedside to discuss discharge planning. Pt plans to return home and continue outpatient therapy with Danielito Tran. Pt denies DC needs.

## 2023-05-16 NOTE — PROGRESS NOTES
[facility] [place] Respiratory Therapy Evaluation   Current Order:  Earleen [place] TID & Albuterol Q2 PRN      Home Regimen: PRN      Ordering Physician: Roberth Lees  Re-evaluation Date:  5/11     Diagnosis: Hypoxia      Patient Status: Stable / Unstable + Physician notified    The following MDI Criteria must be met in order to convert aerosol to MDI with spacer. If unable to meet, MDI will be converted to aerosol:  []  Patient able to demonstrate the ability to use MDI effectively  []  Patient alert and cooperative  []  Patient able to take deep breath with 5-10 second hold  []  Medication(s) available in this delivery method   []  Peak flow greater than or equal to 200 ml/min            Current Order Substituted To  (same drug, same frequency)   Aerosol to MDI [] Albuterol Sulfate 0.083% unit dose by aerosol Albuterol Sulfate MDI 2 puffs by inhalation with spacer    [] Levalbuterol 1.25 mg unit dose by aerosol Levalbuterol MDI 2 puffs by inhalation with spacer    [] Levalbuterol 0.63 mg unit dose by aerosol Levalbuterol MDI 2 puffs by inhalation with spacer    [] Ipratropium Bromide 0.02% unit dose by aerosol Ipratropium Bromide MDI 2 puffs by inhalation with spacer    [] Duoneb (Ipratropium + Albuterol) unit dose by aerosol Ipratropium MDI + Albuterol MDI 2 puffs by inhalation w/spacer   MDI to Aerosol [] Albuterol Sulfate MDI Albuterol Sulfate 0.083% unit dose by aerosol    [] Levalbuterol MDI 2 puffs by inhalation Levalbuterol 1.25 mg unit dose by aerosol    [] Ipratropium Bromide MDI by inhalation Ipratropium Bromide 0.02% unit dose by aerosol    [] Combivent (Ipratropium + Albuterol) MDI by inhalation Duoneb (Ipratropium + Albuterol) unit dose by aerosol       Treatment Assessment [Frequency/Schedule]:  Change frequency to: _______No Changes___________________________________________per Protocol, P&T, MEC      Points 0 1 2 3 4   Pulmonary Status  Non-Smoker  []   Smoking history   < 20 pack years  []   Smoking history  ?  21
Physical Therapy  Facility/Department: [facility] MED SURG [id number]  Physical Therapy Discharge      NAME: Jus Em    : 1945 (66 y.o.)  MRN: [id number]    Account: [de-identified]  Gender: female      Patient has been discharged from acute care hospital. DC patient from current PT program.      Electronically signed by Skip Kohler PT on 23 at 4:32 PM EDT
Physical Therapy Med Surg Daily Treatment Note  [facility]       NAME: Ten Andrade  : 1945 (66 y.o.)  MRN: [id number]  CODE STATUS: Full Code    Date of Service: 2023    Patient Diagnosis(es): Arthrofibrosis of knee joint, left [M24.662]  Closed nondisplaced transverse fracture of left patella, initial encounter [S82.035A]  Hypoxia [R09.02]  Acute respiratory failure with hypoxia ([address] [J96.01]   No chief complaint on file.     Patient Active Problem List    Diagnosis Date Noted    Hypoxia 2023    Acute respiratory failure with hypoxia (HCC) 2023    Heart murmur 2023    Closed nondisplaced transverse fracture of left patella 2023    Arthrofibrosis of knee joint, left 2023    Dyslipidemia 2023    Fibromyalgia 2023    GERD (gastroesophageal reflux disease) 2023    Hypothyroidism 2023    Osteoarthritis of knee 2023    Osteoporosis 2023    Occlusion and stenosis of bilateral carotid arteries 2022    Hyperlipidemia 2019    Stenosis of carotid artery 2019    Asthma 2018        Past Medical History:   Diagnosis Date    Arthritis     Asthma     Carotid artery stenosis     GERD (gastroesophageal reflux disease)     History of blood transfusion     58 yrs ago, childbirth    Hx of blood clots 1982    unsure if DVT or superficial thrombosis    Hyperlipidemia     Hypertension     Thyroid disease      Past Surgical History:   Procedure Laterality Date    CARDIAC SURGERY      cardiac cath    CERVIX LESION DESTRUCTION      COLONOSCOPY      ENDOSCOPY, COLON, DIAGNOSTIC      EYE SURGERY      b/l cataract    HEMORRHOID SURGERY      KNEE ARTHROSCOPY Left 2023    REMOVAL OF DEEP HARDWARE LEFT  PATELLA/KNEE LEFT KNEE ARTHROSCOPY WITH  EXTENSIVE SYNOVECTOMY OF ADHESION/ MANIPULATION UNDER ANESTHESIA performed by Neftali Carpenter MD at [facility]     four surgies to the left
Physician Progress Note      Hannah Gibbons  [facility] #:                  [id number]  :                       1945  ADMIT DATE:       2023 8:42 AM  [address] [place] DATE:        2023 3:10 PM  RESPONDING  PROVIDER #:        Brittney Estrada DO          QUERY TEXT:    Patient admitted for elective removal of deep hardware Lt knee. Following   procedure pt developed \"hypoxia related to atelectasis\" and was admitted   inpatient. Acute respiratory failure with hypoxia noted by attending    though d/c summary. SpO2 87% on RA, pt was placed on 2-3L O2 . No documented   work of breathing. In order to support the diagnosis of acute respiratory   failure, please include additional clinical indicators in your documentation. Or please document if the diagnosis of acute respiratory failure has been   ruled out after further study. The medical record reflects the following:  Risk Factors: atelectasis on CXR, h/o asthma, surgical procedure, anesthesia  Clinical Indicators:  Dr. MAYER [facility] [email or web address] \"Patient denies shortness of breath,   difficulty breathing. Loreatha Press Loreatha Press \" \"Hypoxia - O2 saturation stable at high 90s on 2 to   3L NC\"   Dr. Elaine Crabtree \" very poor performance on incentive spirometry\". Per nursing and RT documentation on : SPO2 87% on room air at rest.   Improved to 95% on 3L O2. LS with b/l wheezing. No home O2.   Treatment: supplemental O2, incentive spirometry, prn breathing treatments    Acute Respiratory Failure Clinical Indicators per [employer] Training Guide and   Quick Reference Guide:  Supplemental oxygen of 40% or more  Presence of respiratory distress, tachypnea, dyspnea, shortness of breath,   wheezing  Unable to speak in complete sentences  Use of accessory muscles to breathe  Extreme anxiety and feeling of impending doom  Tripod position  Confusion/altered mental status/obtunded    Thank you, Jossie Rodriguez RN BSN CDS  [phone]  Options provided:  -- Acute Respiratory Failure as evidenced
Physician Progress Note      Rodrigo Payne  CSN #:                  [id number]  :                       1945  ADMIT DATE:       2023 8:42 AM  [facility] DATE:        2023 3:10 PM  RESPONDING  PROVIDER #:        Kena Adams DO          QUERY TEXT:    Patient in for elective outpatient removal of deep hardware Lt knee. Following   procedure pt developed \"hypoxia related to atelectasis\" and was admitted   inpatient. Acute respiratory failure with hypoxia noted by attending    though d/c summary. SpO2 87% on RA, pt was placed on 2-3L O2. No documented   work of breathing. In order to support the diagnosis of acute respiratory   failure, please include additional clinical indicators in your documentation. Or please document if the diagnosis of acute respiratory failure has been   ruled out after further study. The medical record reflects the following:  Risk Factors: atelectasis on CXR, h/o asthma on active problem list since   2018 to present (no home medications noted), surgical procedure,   anesthesia. Clinical Indicators:  4 pm nursing \"Difficulty keeping pts O2 sat above 90   %. Auscultation reveals expiratory wheezing bilaterally. Pt states she has   never had trouble breathing. Even though her O2 sat on admission was 87%. Dr Cheryl Milton consulted for breathing tx. Orders received\"   430p nursing \"Call put out to Dr. Abril Bridges. Explained pts ability to keep   her O2 saturation above 87% on room air. Explained the breathing tx. And the   effects of that tx. Expiratory wheezes. Pt states she feels drained at this   time. Breathing not as deep . Discussed admission for pulmonary workup due to   refractory wheezing. He agreed. Orders received. RH\"   Dr. MAYER [facility] \"Patient denies shortness of breath, difficulty   breathing. Thomas Mejia \" \"Hypoxia - O2 saturation stable at high 90s on 2 to 3L NC\"   Dr. Amena Chamorro \" very poor performance on incentive spirometry\".   Per nursing
- - -

## 2023-05-24 ENCOUNTER — OFFICE VISIT (OUTPATIENT)
Dept: PRIMARY CARE | Facility: CLINIC | Age: 78
End: 2023-05-24
Payer: MEDICARE

## 2023-05-24 VITALS
OXYGEN SATURATION: 97 % | HEART RATE: 75 BPM | DIASTOLIC BLOOD PRESSURE: 68 MMHG | SYSTOLIC BLOOD PRESSURE: 106 MMHG | RESPIRATION RATE: 16 BRPM | HEIGHT: 62 IN | WEIGHT: 156 LBS | BODY MASS INDEX: 28.71 KG/M2

## 2023-05-24 DIAGNOSIS — K21.00 GASTROESOPHAGEAL REFLUX DISEASE WITH ESOPHAGITIS, UNSPECIFIED WHETHER HEMORRHAGE: ICD-10-CM

## 2023-05-24 DIAGNOSIS — J98.11 ATELECTASIS: ICD-10-CM

## 2023-05-24 DIAGNOSIS — E03.8 OTHER SPECIFIED HYPOTHYROIDISM: ICD-10-CM

## 2023-05-24 DIAGNOSIS — I35.2 AORTIC VALVE STENOSIS WITH INSUFFICIENCY, ETIOLOGY OF CARDIAC VALVE DISEASE UNSPECIFIED: ICD-10-CM

## 2023-05-24 DIAGNOSIS — M17.0 PRIMARY OSTEOARTHRITIS OF BOTH KNEES: ICD-10-CM

## 2023-05-24 DIAGNOSIS — I10 PRIMARY HYPERTENSION: Primary | ICD-10-CM

## 2023-05-24 DIAGNOSIS — E78.49 OTHER HYPERLIPIDEMIA: ICD-10-CM

## 2023-05-24 DIAGNOSIS — E53.8 VITAMIN B 12 DEFICIENCY: ICD-10-CM

## 2023-05-24 PROCEDURE — 3074F SYST BP LT 130 MM HG: CPT | Performed by: FAMILY MEDICINE

## 2023-05-24 PROCEDURE — 1036F TOBACCO NON-USER: CPT | Performed by: FAMILY MEDICINE

## 2023-05-24 PROCEDURE — 1160F RVW MEDS BY RX/DR IN RCRD: CPT | Performed by: FAMILY MEDICINE

## 2023-05-24 PROCEDURE — 99214 OFFICE O/P EST MOD 30 MIN: CPT | Performed by: FAMILY MEDICINE

## 2023-05-24 PROCEDURE — 3078F DIAST BP <80 MM HG: CPT | Performed by: FAMILY MEDICINE

## 2023-05-24 PROCEDURE — 1159F MED LIST DOCD IN RCRD: CPT | Performed by: FAMILY MEDICINE

## 2023-05-24 RX ORDER — HYDROCHLOROTHIAZIDE 12.5 MG/1
12.5 TABLET ORAL DAILY
Qty: 90 TABLET | Refills: 2 | Status: SHIPPED | OUTPATIENT
Start: 2023-05-24 | End: 2023-11-16 | Stop reason: SDUPTHER

## 2023-05-24 RX ORDER — LISINOPRIL 20 MG/1
20 TABLET ORAL DAILY
COMMUNITY
Start: 2022-10-05 | End: 2023-05-24 | Stop reason: SDUPTHER

## 2023-05-24 RX ORDER — NAPROXEN 500 MG/1
500 TABLET ORAL DAILY PRN
Qty: 90 TABLET | Refills: 2 | Status: SHIPPED | OUTPATIENT
Start: 2023-05-24 | End: 2023-11-16 | Stop reason: SDUPTHER

## 2023-05-24 RX ORDER — BISOPROLOL FUMARATE AND HYDROCHLOROTHIAZIDE 2.5; 6.25 MG/1; MG/1
1 TABLET ORAL DAILY
COMMUNITY
Start: 2022-12-29 | End: 2023-05-24 | Stop reason: WASHOUT

## 2023-05-24 RX ORDER — ROSUVASTATIN CALCIUM 40 MG/1
40 TABLET, COATED ORAL DAILY
Qty: 90 TABLET | Refills: 2 | Status: SHIPPED | OUTPATIENT
Start: 2023-05-24 | End: 2023-11-16 | Stop reason: SDUPTHER

## 2023-05-24 RX ORDER — DOCUSATE SODIUM 100 MG/1
1 CAPSULE, LIQUID FILLED ORAL 2 TIMES DAILY PRN
COMMUNITY
Start: 2023-05-05 | End: 2023-06-04

## 2023-05-24 RX ORDER — TRAMADOL HYDROCHLORIDE 50 MG/1
50 TABLET ORAL
COMMUNITY
Start: 2018-05-21 | End: 2023-11-15 | Stop reason: WASHOUT

## 2023-05-24 RX ORDER — ALENDRONATE SODIUM 70 MG/1
70 TABLET ORAL
Qty: 90 TABLET | Refills: 2 | Status: SHIPPED | OUTPATIENT
Start: 2023-05-24 | End: 2023-11-16 | Stop reason: SDUPTHER

## 2023-05-24 RX ORDER — SIMVASTATIN 80 MG/1
1 TABLET, FILM COATED ORAL NIGHTLY
COMMUNITY
End: 2023-05-24 | Stop reason: DRUGHIGH

## 2023-05-24 RX ORDER — UBIDECARENONE 75 MG
500 CAPSULE ORAL DAILY
Qty: 90 TABLET | Refills: 2 | Status: SHIPPED | OUTPATIENT
Start: 2023-05-24 | End: 2023-11-16 | Stop reason: ALTCHOICE

## 2023-05-24 RX ORDER — LISINOPRIL 20 MG/1
20 TABLET ORAL DAILY
Qty: 90 TABLET | Refills: 2 | Status: SHIPPED | OUTPATIENT
Start: 2023-05-24 | End: 2023-05-31 | Stop reason: SINTOL

## 2023-05-24 RX ORDER — LEVOTHYROXINE SODIUM 125 UG/1
125 TABLET ORAL DAILY
Qty: 90 TABLET | Refills: 2 | Status: SHIPPED | OUTPATIENT
Start: 2023-05-24 | End: 2023-11-16 | Stop reason: SDUPTHER

## 2023-05-24 RX ORDER — UBIDECARENONE 75 MG
1 CAPSULE ORAL DAILY
COMMUNITY
End: 2023-05-24 | Stop reason: SDUPTHER

## 2023-05-24 RX ORDER — OMEPRAZOLE 40 MG/1
40 CAPSULE, DELAYED RELEASE ORAL
Qty: 90 CAPSULE | Refills: 2 | Status: SHIPPED | OUTPATIENT
Start: 2023-05-24 | End: 2023-07-27 | Stop reason: ALTCHOICE

## 2023-05-24 RX ORDER — LOSARTAN POTASSIUM 50 MG/1
50 TABLET ORAL DAILY
Qty: 90 TABLET | Refills: 2 | Status: SHIPPED | OUTPATIENT
Start: 2023-05-24 | End: 2023-08-14 | Stop reason: SDUPTHER

## 2023-05-24 RX ORDER — AMLODIPINE BESYLATE 5 MG/1
5 TABLET ORAL
Qty: 1 TABLET | Refills: 3 | Status: SHIPPED | OUTPATIENT
Start: 2023-05-24 | End: 2023-05-31 | Stop reason: SDUPTHER

## 2023-05-24 RX ORDER — OMEPRAZOLE 40 MG/1
1 CAPSULE, DELAYED RELEASE ORAL DAILY
COMMUNITY
Start: 2021-11-30 | End: 2023-05-24 | Stop reason: SDUPTHER

## 2023-05-24 ASSESSMENT — ENCOUNTER SYMPTOMS
SINUS PAIN: 0
POLYDIPSIA: 0
BLOOD IN STOOL: 0
BACK PAIN: 0
SORE THROAT: 0
DIZZINESS: 0
FREQUENCY: 0
BRUISES/BLEEDS EASILY: 0
COUGH: 0
SHORTNESS OF BREATH: 0
FATIGUE: 1
CHEST TIGHTNESS: 0
ABDOMINAL PAIN: 0

## 2023-05-24 NOTE — ASSESSMENT & PLAN NOTE
Only taking medications 3 times weekly that is for heartburn and she is stable encouraged to continue B12/magnesium supplementation

## 2023-05-24 NOTE — PATIENT INSTRUCTIONS
Please consider exercise program involving walking or some other form of aerobic activity 5 days weekly for 30 minutes... Let's also consider strengthening of large muscle groups like the abdominal muscles or shoulder muscles... Twice weekly with reps of 5/10/15 exercises and gradually increase strength.. This is not heavy strength training but light weight training... Sit ups or back exercise routine.. Please ask for handout if uncertain how to do..This  will help to strengthen your muscles which in turn will help you to lose weight.... You might ask what is the best diet available.. I would strongly encourage you to consider  Weight Watchers.. And as  your  fellow on  Weight Watchers physician attempting to  live this  LIFE  style  choice with you....  I will be glad to give you recommendations on what to eat.. Consider buying Nydia bread.., jessica bagle thin bread.. oikos yogurt... eggs  to eat as hard-boiled... Halo top ice cream for snack... All these are delicious foods which.. when eaten and  being compliant eating three  meals daily  breakfast lunch and dinner, drinking  64 ounces of water daily we will all win together !!!!!!!. This will be a means for you to lose weight... Consider also the smart phone bharti ... My plate.. Or My  fitness  pal..,  as additional possibilities for weight loss... Good  lucvonnie Camilo!    Discussed medication side effects.  The  risk benefits and treatment options  discussed with patient.  Better or so we added his name at    Please schedule follow-up appointment based upon your improvement/failure to improve/chronic medical conditions and physician recommendations during office appointment at the .  For lesion, open ended    Patient advised to go to er if symptoms worsen or to call answering service, or to return to office for additional evaluation    This note was partially  generated using Dragon voice recognition and there may be incorrect words, wording,  spelling, or pronunciation errors that were not corrected prior to committing the note to the medical record.

## 2023-05-24 NOTE — PROGRESS NOTES
Subjective   Patient ID: Maya Haines is a 78 y.o. female who presents for Follow-up (After Knee surgery -   Is doing therapy).    HPI   Patient here for follow-up after knee surgery.  Patient was in the hospital for complicated surgery and had she had pneumothorax following surgery.  Last visit here in the office was 12/29/2022.  At that time she was seen for blood pressure she was put on losartan after ACE inhibitor and we made on ARB but still had cough and the question was could ARB be causing cough.  At that time we discussed alternative medicine and her past medical history is remarkable for carotid artery stenosis along with aortic valve disease so her hydrochlorothiazide was restarted.  In addition patient has reflux esophagitis however she has been stable on proton pump inhibitors and he has been monitoring for complications with magnesium and B12 supplementation intermittently.  Finally in light of carotid artery disease cholesterol management has been watched closely with attempt to reduce LDL.  She remains without muscle pain nausea or muscle cramps  Patient was seen by cardiology and review of his note indicates the impending surgery by Dr. Fisher for 70% blockage of the left carotid and 50% of the right.  Left carotid stent was planned and blood pressure was noted to be stable.  Review of Systems   Constitutional:  Positive for fatigue.   HENT:  Negative for ear pain, sinus pain and sore throat.    Respiratory:  Negative for cough, chest tightness and shortness of breath.    Cardiovascular:  Negative for chest pain.        Describes soreness mid chest slight sore and hurts to touch     Gastrointestinal:  Negative for abdominal pain and blood in stool.   Endocrine: Negative for cold intolerance, heat intolerance, polydipsia and polyuria.   Genitourinary:  Negative for frequency.   Musculoskeletal:  Negative for back pain.   Skin:  Negative for rash.   Neurological:  Negative for dizziness.  "  Hematological:  Does not bruise/bleed easily.       Objective   /68   Pulse 75   Resp 16   Ht 1.575 m (5' 2\")   Wt 70.8 kg (156 lb)   SpO2 97%   BMI 28.53 kg/m²     Physical Exam  Vitals: I have reviewed the vitals  General: Well-developed.  In no acute distress.  Eyes:   sclera nonicteric.  Conjunctiva not injected.  No discharge.   HEAD: Normocephalic, atraumatic.  HEENT   Mucous membranes moist.  Posterior oropharynx nonerythematous, no tonsillar exudates.      No cervical lymphadenopathy.  Cardio: Regular rate and rhythm.  No murmur, rub or gallop.  Pulmonary: Lungs clear to auscultation in all fields.  No accessory muscle use.  GI/: Normal active bowel sounds.  Soft, nontender.  No masses or organomegaly appreciated.  Musculoskeletal: Left knee with diminished extension and dry dressing noted over the anterior aspect of the knee without evidence of hemorrhage  Neuro: Alert, age-appropriate.  Normal muscle tone.  Moving all extremities.  Skin: No rash, bruises or lesions.   Review of testing from the hospital dated 4/28/2023 [facility] shows chest x-ray with no evidence of pneumothorax cardiomediastinal silhouette is without acute process on the floor without focal abnormality noted.  Chest x-ray dated 5/6/2023 showed lungs are without acute focal process there is no effusion or pneumothorax.  CT of the chest with and without contrast dated 5/7/2023 showed patchy atelectatic change identified in the dependent portion of the lung no pleural effusion or pneumothorax.  Reason for CT was due to persistent hypoxia and patient was given prescription for use of incentive spirometry after discharge from the hospital.    Results of laboratory testing revealed CMP with liver test within normal limits CBC with differential hemoglobin low at 10.8 hematocrit 32.4 WBC 7.8 platelets 265 recheck CBC 5/7/2023 showed hemoglobin 9.0 hematocrit 26.9 WBC 8.5 with platelets 191 BMP remarkable for potassium 3.8 otherwise " within normal limits with normal kidney function on 5/7/2023.    Assessment/Plan   Problem List Items Addressed This Visit          Respiratory    Atelectasis     Order chest x-ray however I suspect that with your oxygen level today being 98% and you using the low to incentive spirometer the previous atelectasis has resolved and we will get chest x-ray to confirm resolution.  Continue incentive spirometry until completed per pulmonary medicine         Relevant Orders    XR chest 2 views       Circulatory    Aortic insufficiency with aortic stenosis    Relevant Medications    amLODIPine (Norvasc) 5 mg tablet    Primary hypertension - Primary     Remains compliant on medications and stable and will check labs continue medications         Relevant Medications    losartan (Cozaar) 50 mg tablet    lisinopril 20 mg tablet    hydroCHLOROthiazide (HYDRODiuril) 12.5 mg tablet    alendronate (Fosamax) 70 mg tablet    Other Relevant Orders    Comprehensive Metabolic Panel    Magnesium       Digestive    GERD (gastroesophageal reflux disease)     Only taking medications 3 times weekly that is for heartburn and she is stable encouraged to continue B12/magnesium supplementation         Relevant Medications    omeprazole (PriLOSEC) 40 mg DR capsule    Other Relevant Orders    CBC and Auto Differential       Musculoskeletal    Osteoarthritis of knee     Discussed pain management and Naprosyn he had needs to review with her orthopedic specialist encouraged her not to take in light of it raising blood pressure and would prefer ice along with Tylenol and discussed appropriate blood pressure management and to take only rarely if necessary         Relevant Medications    naproxen (Naprosyn) 500 mg tablet       Endocrine/Metabolic    Hypothyroidism     Remains compliant on medication and thyroid testing ordered advised to take on empty stomach we will recheck labs to ensure stability         Relevant Medications    levothyroxine  (Synthroid, Levoxyl) 125 mcg tablet    Other Relevant Orders    Thyroxine, Free    Thyroid Stimulating Hormone       Other    Hyperlipidemia     Lipids dated 12/21 showed cholesterol 149 with LDL 83 we will get repeat labs to confirm stability         Relevant Medications    rosuvastatin (Crestor) 40 mg tablet    amLODIPine (Norvasc) 5 mg tablet    Other Relevant Orders    Lipid Panel     Other Visit Diagnoses       Vitamin B 12 deficiency        Relevant Medications    cyanocobalamin (Vitamin B-12) 500 mcg tablet          5/28/2023 TSH appears to indicate too much medication will advise to stop 1 day weekly and repeat TSH in 8 weeks and order placed in chart

## 2023-05-24 NOTE — ASSESSMENT & PLAN NOTE
Lipids dated 12/21 showed cholesterol 149 with LDL 83 we will get repeat labs to confirm stability

## 2023-05-24 NOTE — ASSESSMENT & PLAN NOTE
Order chest x-ray however I suspect that with your oxygen level today being 98% and you using the low to incentive spirometer the previous atelectasis has resolved and we will get chest x-ray to confirm resolution.  Continue incentive spirometry until completed per pulmonary medicine

## 2023-05-24 NOTE — ASSESSMENT & PLAN NOTE
Remains compliant on medication and thyroid testing ordered advised to take on empty stomach we will recheck labs to ensure stability

## 2023-05-24 NOTE — ASSESSMENT & PLAN NOTE
Discussed pain management and Naprosyn he had needs to review with her orthopedic specialist encouraged her not to take in light of it raising blood pressure and would prefer ice along with Tylenol and discussed appropriate blood pressure management and to take only rarely if necessary

## 2023-05-25 ENCOUNTER — LAB (OUTPATIENT)
Dept: LAB | Facility: LAB | Age: 78
End: 2023-05-25
Payer: MEDICARE

## 2023-05-25 DIAGNOSIS — E78.49 OTHER HYPERLIPIDEMIA: ICD-10-CM

## 2023-05-25 DIAGNOSIS — I10 PRIMARY HYPERTENSION: ICD-10-CM

## 2023-05-25 DIAGNOSIS — K21.00 GASTROESOPHAGEAL REFLUX DISEASE WITH ESOPHAGITIS, UNSPECIFIED WHETHER HEMORRHAGE: ICD-10-CM

## 2023-05-25 DIAGNOSIS — E03.8 OTHER SPECIFIED HYPOTHYROIDISM: ICD-10-CM

## 2023-05-25 LAB
ALANINE AMINOTRANSFERASE (SGPT) (U/L) IN SER/PLAS: 13 U/L (ref 7–45)
ALBUMIN (G/DL) IN SER/PLAS: 4.2 G/DL (ref 3.4–5)
ALKALINE PHOSPHATASE (U/L) IN SER/PLAS: 49 U/L (ref 33–136)
ANION GAP IN SER/PLAS: 12 MMOL/L (ref 10–20)
ASPARTATE AMINOTRANSFERASE (SGOT) (U/L) IN SER/PLAS: 19 U/L (ref 9–39)
BASOPHILS (10*3/UL) IN BLOOD BY AUTOMATED COUNT: 0.04 X10E9/L (ref 0–0.1)
BASOPHILS/100 LEUKOCYTES IN BLOOD BY AUTOMATED COUNT: 0.7 % (ref 0–2)
BILIRUBIN TOTAL (MG/DL) IN SER/PLAS: 0.5 MG/DL (ref 0–1.2)
CALCIUM (MG/DL) IN SER/PLAS: 9.8 MG/DL (ref 8.6–10.3)
CARBON DIOXIDE, TOTAL (MMOL/L) IN SER/PLAS: 29 MMOL/L (ref 21–32)
CHLORIDE (MMOL/L) IN SER/PLAS: 103 MMOL/L (ref 98–107)
CHOLESTEROL (MG/DL) IN SER/PLAS: 127 MG/DL (ref 0–199)
CHOLESTEROL IN HDL (MG/DL) IN SER/PLAS: 46.6 MG/DL
CHOLESTEROL/HDL RATIO: 2.7
CREATININE (MG/DL) IN SER/PLAS: 0.86 MG/DL (ref 0.5–1.05)
EOSINOPHILS (10*3/UL) IN BLOOD BY AUTOMATED COUNT: 0.33 X10E9/L (ref 0–0.4)
EOSINOPHILS/100 LEUKOCYTES IN BLOOD BY AUTOMATED COUNT: 5.4 % (ref 0–6)
ERYTHROCYTE DISTRIBUTION WIDTH (RATIO) BY AUTOMATED COUNT: 13.2 % (ref 11.5–14.5)
ERYTHROCYTE MEAN CORPUSCULAR HEMOGLOBIN CONCENTRATION (G/DL) BY AUTOMATED: 31.3 G/DL (ref 32–36)
ERYTHROCYTE MEAN CORPUSCULAR VOLUME (FL) BY AUTOMATED COUNT: 89 FL (ref 80–100)
ERYTHROCYTES (10*6/UL) IN BLOOD BY AUTOMATED COUNT: 3.89 X10E12/L (ref 4–5.2)
GFR FEMALE: 69 ML/MIN/1.73M2
GLUCOSE (MG/DL) IN SER/PLAS: 127 MG/DL (ref 74–99)
HEMATOCRIT (%) IN BLOOD BY AUTOMATED COUNT: 34.5 % (ref 36–46)
HEMOGLOBIN (G/DL) IN BLOOD: 10.8 G/DL (ref 12–16)
IMMATURE GRANULOCYTES/100 LEUKOCYTES IN BLOOD BY AUTOMATED COUNT: 0.2 % (ref 0–0.9)
LDL: 60 MG/DL (ref 0–99)
LEUKOCYTES (10*3/UL) IN BLOOD BY AUTOMATED COUNT: 6.1 X10E9/L (ref 4.4–11.3)
LYMPHOCYTES (10*3/UL) IN BLOOD BY AUTOMATED COUNT: 1.16 X10E9/L (ref 0.8–3)
LYMPHOCYTES/100 LEUKOCYTES IN BLOOD BY AUTOMATED COUNT: 18.9 % (ref 13–44)
MAGNESIUM (MG/DL) IN SER/PLAS: 1.98 MG/DL (ref 1.6–2.4)
MONOCYTES (10*3/UL) IN BLOOD BY AUTOMATED COUNT: 0.75 X10E9/L (ref 0.05–0.8)
MONOCYTES/100 LEUKOCYTES IN BLOOD BY AUTOMATED COUNT: 12.2 % (ref 2–10)
NEUTROPHILS (10*3/UL) IN BLOOD BY AUTOMATED COUNT: 3.85 X10E9/L (ref 1.6–5.5)
NEUTROPHILS/100 LEUKOCYTES IN BLOOD BY AUTOMATED COUNT: 62.6 % (ref 40–80)
PLATELETS (10*3/UL) IN BLOOD AUTOMATED COUNT: 327 X10E9/L (ref 150–450)
POTASSIUM (MMOL/L) IN SER/PLAS: 4.1 MMOL/L (ref 3.5–5.3)
PROTEIN TOTAL: 7.1 G/DL (ref 6.4–8.2)
SODIUM (MMOL/L) IN SER/PLAS: 140 MMOL/L (ref 136–145)
THYROTROPIN (MIU/L) IN SER/PLAS BY DETECTION LIMIT <= 0.05 MIU/L: 0.03 MIU/L (ref 0.44–3.98)
THYROXINE (T4) FREE (NG/DL) IN SER/PLAS: 1.79 NG/DL (ref 0.61–1.12)
TRIGLYCERIDE (MG/DL) IN SER/PLAS: 103 MG/DL (ref 0–149)
UREA NITROGEN (MG/DL) IN SER/PLAS: 19 MG/DL (ref 6–23)
VLDL: 21 MG/DL (ref 0–40)

## 2023-05-25 PROCEDURE — 85025 COMPLETE CBC W/AUTO DIFF WBC: CPT

## 2023-05-25 PROCEDURE — 84443 ASSAY THYROID STIM HORMONE: CPT

## 2023-05-25 PROCEDURE — 84439 ASSAY OF FREE THYROXINE: CPT

## 2023-05-25 PROCEDURE — 80053 COMPREHEN METABOLIC PANEL: CPT

## 2023-05-25 PROCEDURE — 83735 ASSAY OF MAGNESIUM: CPT

## 2023-05-25 PROCEDURE — 36415 COLL VENOUS BLD VENIPUNCTURE: CPT

## 2023-05-25 PROCEDURE — 80061 LIPID PANEL: CPT

## 2023-05-28 DIAGNOSIS — D50.8 OTHER IRON DEFICIENCY ANEMIA: Primary | ICD-10-CM

## 2023-05-31 RX ORDER — AMLODIPINE BESYLATE 5 MG/1
5 TABLET ORAL
Qty: 1 TABLET | Refills: 3 | Status: SHIPPED | OUTPATIENT
Start: 2023-05-31 | End: 2023-06-01

## 2023-06-01 DIAGNOSIS — E78.49 OTHER HYPERLIPIDEMIA: ICD-10-CM

## 2023-06-01 RX ORDER — AMLODIPINE BESYLATE 5 MG/1
5 TABLET ORAL
Qty: 1 TABLET | Refills: 3 | Status: SHIPPED | OUTPATIENT
Start: 2023-06-01 | End: 2023-11-16 | Stop reason: SDUPTHER

## 2023-06-06 RX ORDER — LISINOPRIL 20 MG/1
TABLET ORAL
Refills: 2 | OUTPATIENT
Start: 2023-06-06

## 2023-06-06 RX ORDER — AMLODIPINE BESYLATE 5 MG/1
5 TABLET ORAL DAILY
Qty: 90 TABLET | Refills: 3 | OUTPATIENT
Start: 2023-06-06 | End: 2024-06-05

## 2023-07-20 ENCOUNTER — PATIENT OUTREACH (OUTPATIENT)
Dept: CARE COORDINATION | Facility: CLINIC | Age: 78
End: 2023-07-20

## 2023-07-20 RX ORDER — OXYCODONE HYDROCHLORIDE 5 MG/1
5 TABLET ORAL EVERY 6 HOURS PRN
COMMUNITY
End: 2023-11-15 | Stop reason: WASHOUT

## 2023-07-20 RX ORDER — ACETAMINOPHEN 500 MG
1000 TABLET ORAL EVERY 6 HOURS
COMMUNITY
End: 2023-11-16 | Stop reason: ALTCHOICE

## 2023-07-20 NOTE — PROGRESS NOTES
Discharge Facility: [facility]  Discharge Diagnosis: Bilateral carotid artery stenosis   Admission Date: 7/18/2023  Discharge Date: 7/19/2023    PCP Appointment Date: 7/27/2023 0920    Specialist Appointment Date:   -8/22/2023 surgeon follow up    Hospital Encounter and Summary: Linked    See discharge assessment below for further details    Engagement  Call Start Time: 1102 (7/20/2023 11:04 AM)    Medications  Medications reviewed with patient/caregiver?: Yes (new prescriptions reviewed - oxycodone and tylenol) (7/20/2023 11:04 AM)  Is the patient having any side effects they believe may be caused by any medication additions or changes?: No (7/20/2023 11:04 AM)  Does the patient have all medications ordered at discharge?: Yes (7/20/2023 11:04 AM)  Care Management Interventions: No intervention needed (7/20/2023 11:04 AM)  Is the patient taking all medications as directed (includes completed medication regime)?: Yes (7/20/2023 11:04 AM)    Appointments  Does the patient have a primary care provider?: Yes (7/20/2023 11:04 AM)  Care Management Interventions: Verified appointment date/time/provider (7/20/2023 11:04 AM)  Care Management Interventions: Advised patient to keep appointment (7/20/2023 11:04 AM)    Self Management  Has home health visited the patient within 72 hours of discharge?: Not applicable (7/20/2023 11:04 AM)    Patient Teaching  Does the patient have access to their discharge instructions?: Yes (7/20/2023 11:04 AM)  Care Management Interventions: Reviewed instructions with patient (7/20/2023 11:04 AM)  What is the patient's perception of their health status since discharge?: Improving (7/20/2023 11:04 AM)  Is the patient/caregiver able to teach back the hierarchy of who to call/visit for symptoms/problems? PCP, Specialist, Home Health nurse, Urgent Care, ED, 911: Yes (7/20/2023 11:04 AM)    Wrap Up  Wrap Up Additional Comments: Pt states she is doing well since being  discharged. Pt does report some soreness at surgical sites. Pt denies any other issues or concerns. Pt had left [facility] 7/18. Pt follow up with surgeon scheduled for 8/22. Pt reports understanding of discharge instructions. Pt encouraged to call if questions arise. (7/20/2023 11:04 AM)  Call End Time: 1106 (7/20/2023 11:04 AM)

## 2023-07-27 ENCOUNTER — OFFICE VISIT (OUTPATIENT)
Dept: PRIMARY CARE | Facility: CLINIC | Age: 78
End: 2023-07-27
Payer: MEDICARE

## 2023-07-27 DIAGNOSIS — D64.9 POSTOPERATIVE ANEMIA: ICD-10-CM

## 2023-07-27 DIAGNOSIS — K21.9 GASTROESOPHAGEAL REFLUX DISEASE, UNSPECIFIED WHETHER ESOPHAGITIS PRESENT: ICD-10-CM

## 2023-07-27 DIAGNOSIS — R00.2 PALPITATIONS: ICD-10-CM

## 2023-07-27 DIAGNOSIS — I65.29 ASYMPTOMATIC CAROTID ARTERY STENOSIS, UNSPECIFIED LATERALITY: Primary | ICD-10-CM

## 2023-07-27 PROBLEM — E86.1 HYPOVOLEMIA: Status: ACTIVE | Noted: 2023-07-19

## 2023-07-27 PROBLEM — I65.23 OCCLUSION AND STENOSIS OF BILATERAL CAROTID ARTERIES: Status: ACTIVE | Noted: 2022-07-29

## 2023-07-27 PROBLEM — M24.662 ARTHROFIBROSIS OF KNEE JOINT, LEFT: Status: ACTIVE | Noted: 2023-04-28

## 2023-07-27 PROBLEM — R73.09 ELEVATED GLUCOSE: Status: ACTIVE | Noted: 2023-06-20

## 2023-07-27 PROBLEM — S82.035A CLOSED NONDISPLACED TRANSVERSE FRACTURE OF LEFT PATELLA: Status: ACTIVE | Noted: 2023-04-28

## 2023-07-27 PROBLEM — G89.18 ACUTE POST-OPERATIVE PAIN: Status: ACTIVE | Noted: 2023-07-18

## 2023-07-27 PROBLEM — J96.01 ACUTE RESPIRATORY FAILURE WITH HYPOXIA (MULTI): Status: ACTIVE | Noted: 2023-05-05

## 2023-07-27 PROBLEM — I95.81 HYPOTENSION AFTER PROCEDURE: Status: ACTIVE | Noted: 2023-07-18

## 2023-07-27 PROBLEM — R01.1 HEART MURMUR: Status: ACTIVE | Noted: 2023-04-28

## 2023-07-27 PROCEDURE — 1159F MED LIST DOCD IN RCRD: CPT | Performed by: FAMILY MEDICINE

## 2023-07-27 PROCEDURE — 1036F TOBACCO NON-USER: CPT | Performed by: FAMILY MEDICINE

## 2023-07-27 PROCEDURE — 1160F RVW MEDS BY RX/DR IN RCRD: CPT | Performed by: FAMILY MEDICINE

## 2023-07-27 PROCEDURE — 99495 TRANSJ CARE MGMT MOD F2F 14D: CPT | Performed by: FAMILY MEDICINE

## 2023-07-27 NOTE — ASSESSMENT & PLAN NOTE
S/p surgery day 8 and good recovery with compliance with clopidogrel 75 mg daily... Healing well with postsurgical pain increased with certain neck movements

## 2023-07-27 NOTE — PROGRESS NOTES
"Patient: Maya Haines  : 1945  PCP: Ernst Camilo DO  MRN: [id number]  Program: No linked episodes     Maay Haines is a 78 y.o. female presenting today for follow-up after being discharged from the hospital 7 days ago. The main problem requiring admission was carotid endarterectomy. The discharge summary and/or Transitional Care Management documentation was reviewed. Medication reconciliation was performed as indicated via the \"Volodymyr as Reviewed\" timestamp.     Maya Haines was contacted by Transitional Care Management services two days after her discharge. This encounter and supporting documentation was reviewed.    The complexity of medical decision making for this patient's transitional care is moderate.    Physical Exam  general: alert oriented x three  HEENT hearing normal to voice  Neck supple with well approximated wound edges and no erythema noted with dry dressing and intact  Lungs respirations non-labored.  Cardiovascular: no peripheral edema  Skin: warm and dry without rash  Psych: judgement and insight normal  Musculoskeletal:  ambulation normal,    lymph:negative cervical  LYMPADENOPATHY  thyroid: non palpable enlargement     results of labs dated 2023 showed potassium 3.9 GFR 62 CBC with hemoglobin decreased 9.2 hematocrit 29.2 WBC 7.22 with platelets 193 magnesium 9    Recheck of previous hemoglobin 11.6 hematocrit 36.2 WBC 7.77 platelets 273    Hemoglobin A1c 6.5     Assessment/Plan   Problem List Items Addressed This Visit       Carotid stenosis, asymptomatic - Primary     S/p surgery day 8 and good recovery with compliance with clopidogrel 75 mg daily... Healing well with postsurgical pain increased with certain neck movements         GERD (gastroesophageal reflux disease)     Omeprazole discontinued and placed on pantoprazole in light of med interaction with clopidogrel patient stable on current dose and no new symptoms well-controlled GERD without difficulty swallowing pain on " swallowing or hoarseness.  Does have some postsurgical neck pain        Postop anemia we will recheck CBC and magnesium increase iron in diet    Review of Systems  cardiovascular:  no  palpitations or chest  pain  respiratory: no  shortness  of  breath  endocrine:  no polydipsia,  no polyuria  musculoskeletal:  no  myalgia.. yes  arthralgia  All other  systems discussed  negative   Family History   Problem Relation Name Age of Onset    Asthma Mother      Diabetes Mother      Hypertension Mother      Other (Heart problem) Mother      Other (Cardiac disorder) Father      Other (Heart problem) Father      Other (Arterial Stent) Sister      Colon cancer Brother         Engagement  Call Start Time: 1102 (7/20/2023 11:04 AM)    Medications  Medications reviewed with patient/caregiver?: Yes (new prescriptions reviewed - oxycodone and tylenol) (7/20/2023 11:04 AM)  Is the patient having any side effects they believe may be caused by any medication additions or changes?: No (7/20/2023 11:04 AM)  Does the patient have all medications ordered at discharge?: Yes (7/20/2023 11:04 AM)  Care Management Interventions: No intervention needed (7/20/2023 11:04 AM)  Is the patient taking all medications as directed (includes completed medication regime)?: Yes (7/20/2023 11:04 AM)    Appointments  Does the patient have a primary care provider?: Yes (7/20/2023 11:04 AM)  Care Management Interventions: Verified appointment date/time/provider (7/20/2023 11:04 AM)  Care Management Interventions: Advised patient to keep appointment (7/20/2023 11:04 AM)    Self Management  Has home health visited the patient within 72 hours of discharge?: Not applicable (7/20/2023 11:04 AM)    Patient Teaching  Does the patient have access to their discharge instructions?: Yes (7/20/2023 11:04 AM)  Care Management Interventions: Reviewed instructions with patient (7/20/2023 11:04 AM)  What is the patient's perception of their health status since discharge?:  Improving (7/20/2023 11:04 AM)  Is the patient/caregiver able to teach back the hierarchy of who to call/visit for symptoms/problems? PCP, Specialist, Home Health nurse, Urgent Care, ED, 911: Yes (7/20/2023 11:04 AM)    Wrap Up  Wrap Up Additional Comments: Pt states she is doing well since being discharged. Pt does report some soreness at surgical sites. Pt denies any other issues or concerns. Pt had left [facility] 7/18. Pt follow up with surgeon scheduled for 8/22. Pt reports understanding of discharge instructions. Pt encouraged to call if questions arise. (7/20/2023 11:04 AM)  Call End Time: 1106 (7/20/2023 11:04 AM)        No follow-ups on file.

## 2023-07-27 NOTE — PATIENT INSTRUCTIONS

## 2023-07-27 NOTE — ASSESSMENT & PLAN NOTE
Omeprazole discontinued and placed on pantoprazole in light of med interaction with clopidogrel patient stable on current dose and no new symptoms well-controlled GERD without difficulty swallowing pain on swallowing or hoarseness.  Does have some postsurgical neck pain

## 2023-07-28 ENCOUNTER — PATIENT OUTREACH (OUTPATIENT)
Dept: CARE COORDINATION | Facility: CLINIC | Age: 78
End: 2023-07-28

## 2023-07-28 NOTE — PROGRESS NOTES
Call regarding appt. with PCP on 7/27/2023 after hospitalization.  At time of outreach call the patient feels as if their condition has improved since last visit.  Reviewed the PCP appointment with the pt and addressed any questions or concerns.  Pt encouraged to call if questions arise.

## 2023-08-14 DIAGNOSIS — I10 PRIMARY HYPERTENSION: ICD-10-CM

## 2023-08-14 RX ORDER — LOSARTAN POTASSIUM 50 MG/1
50 TABLET ORAL DAILY
Qty: 90 TABLET | Refills: 3 | Status: SHIPPED | OUTPATIENT
Start: 2023-08-14 | End: 2023-11-16 | Stop reason: SDUPTHER

## 2023-08-25 ENCOUNTER — PATIENT OUTREACH (OUTPATIENT)
Dept: CARE COORDINATION | Facility: CLINIC | Age: 78
End: 2023-08-25

## 2023-09-25 DIAGNOSIS — K21.00 GASTROESOPHAGEAL REFLUX DISEASE WITH ESOPHAGITIS, UNSPECIFIED WHETHER HEMORRHAGE: ICD-10-CM

## 2023-09-25 RX ORDER — PANTOPRAZOLE SODIUM 40 MG/1
40 TABLET, DELAYED RELEASE ORAL
Qty: 90 TABLET | Refills: 2 | Status: SHIPPED | OUTPATIENT
Start: 2023-09-25 | End: 2023-11-16 | Stop reason: SDUPTHER

## 2023-10-11 ENCOUNTER — PATIENT OUTREACH (OUTPATIENT)
Dept: CARE COORDINATION | Facility: CLINIC | Age: 78
End: 2023-10-11

## 2023-10-11 NOTE — PROGRESS NOTES
Call placed regarding 90 day post discharge follow up call.  At time of outreach call the patient feels as if their condition has improved since initial visit with PCP or specialist.  Pt denies any questions, needs, or concerns at this time. Pt reports she had called the office and had her refills for medications sent.  Verified next PCP appt 11/20/2023 1500.  Pt encouraged to call if questions or needs arise.

## 2023-10-13 DIAGNOSIS — I77.9 CAROTID ARTERY DISEASE, UNSPECIFIED LATERALITY, UNSPECIFIED TYPE (CMS-HCC): ICD-10-CM

## 2023-10-13 RX ORDER — CLOPIDOGREL BISULFATE 75 MG/1
75 TABLET ORAL
Qty: 90 TABLET | Refills: 2 | Status: SHIPPED | OUTPATIENT
Start: 2023-10-13 | End: 2023-11-16 | Stop reason: SDUPTHER

## 2023-10-13 NOTE — TELEPHONE ENCOUNTER
Requested Prescriptions     Pending Prescriptions Disp Refills    clopidogrel (Plavix) 75 mg tablet 30 tablet 2     Sig: Take 1 tablet (75 mg) by mouth once daily.

## 2023-11-14 ENCOUNTER — APPOINTMENT (OUTPATIENT)
Dept: CARDIOLOGY | Facility: CLINIC | Age: 78
End: 2023-11-14

## 2023-11-15 ENCOUNTER — OFFICE VISIT (OUTPATIENT)
Dept: CARDIOLOGY | Facility: CLINIC | Age: 78
End: 2023-11-15
Payer: MEDICARE

## 2023-11-15 VITALS
HEIGHT: 63 IN | DIASTOLIC BLOOD PRESSURE: 64 MMHG | SYSTOLIC BLOOD PRESSURE: 110 MMHG | BODY MASS INDEX: 27.11 KG/M2 | HEART RATE: 69 BPM | WEIGHT: 153 LBS

## 2023-11-15 DIAGNOSIS — T46.4X5A ACE-INHIBITOR COUGH: ICD-10-CM

## 2023-11-15 DIAGNOSIS — E78.2 MIXED HYPERLIPIDEMIA: ICD-10-CM

## 2023-11-15 DIAGNOSIS — I10 PRIMARY HYPERTENSION: Primary | ICD-10-CM

## 2023-11-15 DIAGNOSIS — R06.02 SHORTNESS OF BREATH ON EXERTION: ICD-10-CM

## 2023-11-15 DIAGNOSIS — R05.8 ACE-INHIBITOR COUGH: ICD-10-CM

## 2023-11-15 DIAGNOSIS — I35.2 NONRHEUMATIC AORTIC INSUFFICIENCY WITH AORTIC STENOSIS: ICD-10-CM

## 2023-11-15 DIAGNOSIS — E03.9 HYPOTHYROIDISM, UNSPECIFIED TYPE: ICD-10-CM

## 2023-11-15 DIAGNOSIS — I77.9 CAROTID ARTERY DISEASE, UNSPECIFIED LATERALITY, UNSPECIFIED TYPE (CMS-HCC): ICD-10-CM

## 2023-11-15 PROCEDURE — 3078F DIAST BP <80 MM HG: CPT | Performed by: NURSE PRACTITIONER

## 2023-11-15 PROCEDURE — 1036F TOBACCO NON-USER: CPT | Performed by: NURSE PRACTITIONER

## 2023-11-15 PROCEDURE — 3074F SYST BP LT 130 MM HG: CPT | Performed by: NURSE PRACTITIONER

## 2023-11-15 PROCEDURE — 99213 OFFICE O/P EST LOW 20 MIN: CPT | Performed by: NURSE PRACTITIONER

## 2023-11-15 PROCEDURE — 1160F RVW MEDS BY RX/DR IN RCRD: CPT | Performed by: NURSE PRACTITIONER

## 2023-11-15 PROCEDURE — 1159F MED LIST DOCD IN RCRD: CPT | Performed by: NURSE PRACTITIONER

## 2023-11-15 NOTE — PROGRESS NOTES
Maya Haines is a 78 y.o. female that presents to the office today for cardiac follow up.  She follows with her primary cardiologist Dr. Mario and was added to my schedule today.    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 Fisher.     Overall she states that she feels well, she denies chest pain, chest pressure, chest tightness, palpitations, lightheadedness, dizziness.  She does admit to chronic shortness of breath when walking up stairs that is stable. She continues to be independent and physically active.  She states she follows with her PCP on a regular basis who obtains her lab work for her.    Testing Reviewed  9/30/2022 echocardiogram; LVEF 65%, impaired relaxation pattern of left ventricular diastolic filling, mild aortic valve stenosis.  V-max 2.16M/peak mean gradients 18 and 11.  Aortic valve area estimated to be 1.5 cm.  Mild aortic insufficiency.  Comparison to previous study in 2020 essentially unchanged.    Assessment/plan  1.  Hypertension; well controlled  2.  Aortic stenosis; stable per echo 9/30/2022.  Recommend to repeat echocardiogram in 1 year.  3.  Carotid stenosis; follows with Dr. Fisher.  4.  Follow-up in the office in 6 months with Dr. Fuentes or sooner if needed.      Patient Active Problem List   Diagnosis    Abnormal mammogram    Aortic insufficiency with aortic stenosis    Aortic valve disease    Arteriosclerosis of carotid artery    Asthma    Asymptomatic bilateral carotid artery stenosis    Carotid arterial disease (CMS/HCC)    Carotid stenosis, asymptomatic    Change in bowel habit    COVID-19    Dyslipidemia    Fibromyalgia    Finger laceration    Foot pain    GERD (gastroesophageal reflux disease)    Hyperlipidemia    Primary hypertension    Hyperthyroidism    Hypothyroidism    Lumbar spinal stenosis    Osteoarthritis of knee    Osteopenia     Osteoporosis    Palpitations    Shortness of breath on exertion    Sinobronchitis    Stomatitis    Systolic murmur    Ventricular septal defect    Vitamin D deficiency    Vulvar dermatitis    ACE-inhibitor cough    Overweight with body mass index (BMI) of 27 to 27.9 in adult    Atelectasis    Acute post-operative pain    Acute respiratory failure with hypoxia (CMS/HCC)    Arthrofibrosis of knee joint, left    Closed nondisplaced transverse fracture of left patella    Elevated glucose    Heart murmur    Hypotension after procedure    Hypovolemia    Occlusion and stenosis of bilateral carotid arteries    Senile cataract       Social History     Tobacco Use    Smoking status: Former     Types: Cigarettes    Smokeless tobacco: Never   Vaping Use    Vaping Use: Never used   Substance Use Topics    Alcohol use: Never    Drug use: Never       History reviewed. No pertinent past medical history.      Current Outpatient Medications:     acetaminophen (Tylenol) 500 mg tablet, Take 2 tablets (1,000 mg) by mouth every 6 hours., Disp: , Rfl:     alendronate (Fosamax) 70 mg tablet, Take 1 tablet (70 mg) by mouth 1 (one) time per week., Disp: 90 tablet, Rfl: 2    amLODIPine (Norvasc) 5 mg tablet, TAKE 1 TABLET (5 MG) BY MOUTH EVERY 3 MONTHS., Disp: 1 tablet, Rfl: 3    ASPIRIN ORAL, Take 81 mg by mouth 1 (one) time each day., Disp: , Rfl:     cholecalciferol (Vitamin D-3) 50 MCG (2000 UT) tablet, Take 1 tablet (50 mcg) by mouth once daily., Disp: , Rfl:     clopidogrel (Plavix) 75 mg tablet, Take 1 tablet (75 mg) by mouth once daily., Disp: 90 tablet, Rfl: 2    cyanocobalamin (Vitamin B-12) 500 mcg tablet, Take 1 tablet (500 mcg) by mouth once daily., Disp: 90 tablet, Rfl: 2    hydroCHLOROthiazide (HYDRODiuril) 12.5 mg tablet, Take 1 tablet (12.5 mg) by mouth once daily., Disp: 90 tablet, Rfl: 2    levothyroxine (Synthroid, Levoxyl) 125 mcg tablet, Take 1 tablet (125 mcg) by mouth once daily., Disp: 90 tablet, Rfl: 2    losartan  (Cozaar) 50 mg tablet, Take 1 tablet (50 mg) by mouth once daily., Disp: 90 tablet, Rfl: 3    MAGNESIUM ORAL, Take 250 mg by mouth 1 (one) time each day., Disp: , Rfl:     multivitamin with minerals (MULTIPLE VITAMIN-MINERALS ORAL), Take 1 tablet by mouth 1 (one) time each day., Disp: , Rfl:     naproxen (Naprosyn) 500 mg tablet, Take 1 tablet (500 mg) by mouth once daily as needed (Pain)., Disp: 90 tablet, Rfl: 2    pantoprazole (ProtoNix) 40 mg EC tablet, Take 1 tablet (40 mg) by mouth once daily., Disp: 90 tablet, Rfl: 2    rosuvastatin (Crestor) 40 mg tablet, Take 1 tablet (40 mg) by mouth once daily., Disp: 90 tablet, Rfl: 2    Ace inhibitors, Codeine, and Lisinopril    Family History   Problem Relation Name Age of Onset    Asthma Mother      Diabetes Mother      Hypertension Mother      Other (Heart problem) Mother      Other (Cardiac disorder) Father      Other (Heart problem) Father      Other (Arterial Stent) Sister      Colon cancer Brother         Past Surgical History:   Procedure Laterality Date    CT ANGIO NECK  7/29/2022    CT NECK ANGIO W AND WO IV CONTRAST 7/29/2022 [facility]    CT HEAD ANGIO W AND WO IV CONTRAST  7/29/2022    CT HEAD ANGIO W AND WO IV CONTRAST 7/29/2022 [facility]    OTHER SURGICAL HISTORY  06/03/2020    Cardiac catheterization    OTHER SURGICAL HISTORY  06/03/2020    Hemorrhoidectomy    OTHER SURGICAL HISTORY  06/03/2020    Knee arthroscopy    OTHER SURGICAL HISTORY  06/03/2020    Colonoscopy    OTHER SURGICAL HISTORY  07/14/2022    Ablation    OTHER SURGICAL HISTORY  07/26/2022    Cataract surgery          Review of systems  Constitutional: No weight loss, fever, chills, weakness or fatigue  HEENT: No visual loss, blurred vision, double vision or yellow sclerae  Skin: No rash or itching  Cardiovascular: No chest pain, pressure or discomfort, No palpitations or edema.  Respiratory: No shortness of breath, cough or sputum  Gastrointestinal: No nausea, vomiting  "or diarrhea. No bloody or dark tarry stools.  Neurological: No headache, lightheadedness, dizziness, syncope.   Musculoskeletal: No muscle, back pain, joint pain or stiffness.  Hematologic: No anemia, bleeding or bruising.    /64 (BP Location: Left arm, Patient Position: Sitting)   Pulse 69   Ht 1.6 m (5' 3\")   Wt 69.4 kg (153 lb)   BMI 27.10 kg/m²     Patient Active Problem List   Diagnosis    Abnormal mammogram    Aortic insufficiency with aortic stenosis    Aortic valve disease    Arteriosclerosis of carotid artery    Asthma    Asymptomatic bilateral carotid artery stenosis    Carotid arterial disease (CMS/HCC)    Carotid stenosis, asymptomatic    Change in bowel habit    COVID-19    Dyslipidemia    Fibromyalgia    Finger laceration    Foot pain    GERD (gastroesophageal reflux disease)    Hyperlipidemia    Primary hypertension    Hyperthyroidism    Hypothyroidism    Lumbar spinal stenosis    Osteoarthritis of knee    Osteopenia    Osteoporosis    Palpitations    Shortness of breath on exertion    Sinobronchitis    Stomatitis    Systolic murmur    Ventricular septal defect    Vitamin D deficiency    Vulvar dermatitis    ACE-inhibitor cough    Overweight with body mass index (BMI) of 27 to 27.9 in adult    Atelectasis    Acute post-operative pain    Acute respiratory failure with hypoxia (CMS/HCC)    Arthrofibrosis of knee joint, left    Closed nondisplaced transverse fracture of left patella    Elevated glucose    Heart murmur    Hypotension after procedure    Hypovolemia    Occlusion and stenosis of bilateral carotid arteries    Senile cataract       Physical Exam  Constitutional: Well developed, awake/alert x 3, no distress.  Respiratory/Thorax: patent airways, CTAB, normal breath sounds with good expansion.  Cardiovascular: Regular rate and rhythm, no murmurs, normal S1 and S2,   Gastrointestinal: Non distended, soft, non-tender, no rebound tenderness or guarding.  Extremities: No cyanosis, edema.  "   Neurological: Alert and oriented x 3. Moves extremities spontaneous with purpose.  Psychological: Appropriate mood and behavior  Skin: Warm and Dry. No lesions or rashes.         Please excuse any errors in grammar or translation related to dictation, voice recognition software was used to prepare this document.

## 2023-11-16 ENCOUNTER — OFFICE VISIT (OUTPATIENT)
Dept: PRIMARY CARE | Facility: CLINIC | Age: 78
End: 2023-11-16
Payer: MEDICARE

## 2023-11-16 ENCOUNTER — TELEPHONE (OUTPATIENT)
Dept: PRIMARY CARE | Facility: CLINIC | Age: 78
End: 2023-11-16

## 2023-11-16 VITALS
WEIGHT: 154 LBS | OXYGEN SATURATION: 98 % | SYSTOLIC BLOOD PRESSURE: 121 MMHG | RESPIRATION RATE: 18 BRPM | BODY MASS INDEX: 27.29 KG/M2 | TEMPERATURE: 97 F | DIASTOLIC BLOOD PRESSURE: 63 MMHG | HEART RATE: 74 BPM | HEIGHT: 63 IN

## 2023-11-16 DIAGNOSIS — M17.0 PRIMARY OSTEOARTHRITIS OF BOTH KNEES: ICD-10-CM

## 2023-11-16 DIAGNOSIS — M85.80 OSTEOPENIA, UNSPECIFIED LOCATION: ICD-10-CM

## 2023-11-16 DIAGNOSIS — Z12.31 ENCOUNTER FOR SCREENING MAMMOGRAM FOR BREAST CANCER: ICD-10-CM

## 2023-11-16 DIAGNOSIS — K21.00 GASTROESOPHAGEAL REFLUX DISEASE WITH ESOPHAGITIS, UNSPECIFIED WHETHER HEMORRHAGE: ICD-10-CM

## 2023-11-16 DIAGNOSIS — E78.49 OTHER HYPERLIPIDEMIA: ICD-10-CM

## 2023-11-16 DIAGNOSIS — I10 PRIMARY HYPERTENSION: ICD-10-CM

## 2023-11-16 DIAGNOSIS — Z13.1 DIABETES MELLITUS SCREENING: ICD-10-CM

## 2023-11-16 DIAGNOSIS — Z78.0 ASYMPTOMATIC MENOPAUSAL STATE: ICD-10-CM

## 2023-11-16 DIAGNOSIS — I77.9 CAROTID ARTERY DISEASE, UNSPECIFIED LATERALITY, UNSPECIFIED TYPE (CMS-HCC): ICD-10-CM

## 2023-11-16 DIAGNOSIS — R73.9 HYPERGLYCEMIA, UNSPECIFIED: ICD-10-CM

## 2023-11-16 DIAGNOSIS — G89.29 CHRONIC RIGHT SHOULDER PAIN: ICD-10-CM

## 2023-11-16 DIAGNOSIS — M25.511 CHRONIC RIGHT SHOULDER PAIN: ICD-10-CM

## 2023-11-16 DIAGNOSIS — Z00.00 ROUTINE GENERAL MEDICAL EXAMINATION AT HEALTH CARE FACILITY: Primary | ICD-10-CM

## 2023-11-16 DIAGNOSIS — E03.8 OTHER SPECIFIED HYPOTHYROIDISM: ICD-10-CM

## 2023-11-16 LAB — POC HEMOGLOBIN A1C: 6.3 % (ref 4.2–6.5)

## 2023-11-16 PROCEDURE — 1159F MED LIST DOCD IN RCRD: CPT | Performed by: FAMILY MEDICINE

## 2023-11-16 PROCEDURE — 1170F FXNL STATUS ASSESSED: CPT | Performed by: FAMILY MEDICINE

## 2023-11-16 PROCEDURE — 3074F SYST BP LT 130 MM HG: CPT | Performed by: FAMILY MEDICINE

## 2023-11-16 PROCEDURE — 1036F TOBACCO NON-USER: CPT | Performed by: FAMILY MEDICINE

## 2023-11-16 PROCEDURE — 1160F RVW MEDS BY RX/DR IN RCRD: CPT | Performed by: FAMILY MEDICINE

## 2023-11-16 PROCEDURE — 83036 HEMOGLOBIN GLYCOSYLATED A1C: CPT | Performed by: FAMILY MEDICINE

## 2023-11-16 PROCEDURE — 99214 OFFICE O/P EST MOD 30 MIN: CPT | Performed by: FAMILY MEDICINE

## 2023-11-16 PROCEDURE — G0439 PPPS, SUBSEQ VISIT: HCPCS | Performed by: FAMILY MEDICINE

## 2023-11-16 PROCEDURE — 3078F DIAST BP <80 MM HG: CPT | Performed by: FAMILY MEDICINE

## 2023-11-16 RX ORDER — HYDROCHLOROTHIAZIDE 12.5 MG/1
12.5 TABLET ORAL DAILY
Qty: 90 TABLET | Refills: 2 | Status: SHIPPED | OUTPATIENT
Start: 2023-11-16 | End: 2024-05-20 | Stop reason: SDUPTHER

## 2023-11-16 RX ORDER — LEVOTHYROXINE SODIUM 125 UG/1
125 TABLET ORAL DAILY
Qty: 90 TABLET | Refills: 2 | Status: SHIPPED | OUTPATIENT
Start: 2023-11-16 | End: 2024-05-20 | Stop reason: SDUPTHER

## 2023-11-16 RX ORDER — ALENDRONATE SODIUM 70 MG/1
70 TABLET ORAL
Qty: 90 TABLET | Refills: 2 | Status: SHIPPED | OUTPATIENT
Start: 2023-11-16 | End: 2024-05-20 | Stop reason: SDUPTHER

## 2023-11-16 RX ORDER — NAPROXEN 500 MG/1
500 TABLET ORAL DAILY PRN
Qty: 90 TABLET | Refills: 2 | Status: SHIPPED | OUTPATIENT
Start: 2023-11-16

## 2023-11-16 RX ORDER — AMLODIPINE BESYLATE 5 MG/1
5 TABLET ORAL
Qty: 1 TABLET | Refills: 3 | Status: SHIPPED | OUTPATIENT
Start: 2023-11-16 | End: 2023-11-17 | Stop reason: SDUPTHER

## 2023-11-16 RX ORDER — ROSUVASTATIN CALCIUM 40 MG/1
40 TABLET, COATED ORAL DAILY
Qty: 90 TABLET | Refills: 2 | Status: SHIPPED | OUTPATIENT
Start: 2023-11-16 | End: 2024-05-20 | Stop reason: SDUPTHER

## 2023-11-16 RX ORDER — CLOPIDOGREL BISULFATE 75 MG/1
75 TABLET ORAL
Qty: 90 TABLET | Refills: 2 | Status: SHIPPED | OUTPATIENT
Start: 2023-11-16 | End: 2024-02-14

## 2023-11-16 RX ORDER — LOSARTAN POTASSIUM 50 MG/1
50 TABLET ORAL DAILY
Qty: 90 TABLET | Refills: 3 | Status: SHIPPED | OUTPATIENT
Start: 2023-11-16 | End: 2024-05-20 | Stop reason: SDUPTHER

## 2023-11-16 RX ORDER — PANTOPRAZOLE SODIUM 40 MG/1
40 TABLET, DELAYED RELEASE ORAL
Qty: 90 TABLET | Refills: 2 | Status: SHIPPED | OUTPATIENT
Start: 2023-11-16 | End: 2024-05-20 | Stop reason: SDUPTHER

## 2023-11-16 ASSESSMENT — ENCOUNTER SYMPTOMS
NUMBNESS: 0
OCCASIONAL FEELINGS OF UNSTEADINESS: 0
DEPRESSION: 0
LIGHT-HEADEDNESS: 0
DIZZINESS: 0
APPETITE CHANGE: 0
POLYDIPSIA: 0
CHEST TIGHTNESS: 0
FREQUENCY: 1
LOSS OF SENSATION IN FEET: 0
DIFFICULTY URINATING: 0
FEVER: 0
FATIGUE: 0
SHORTNESS OF BREATH: 0
PALPITATIONS: 0
HEADACHES: 0

## 2023-11-16 ASSESSMENT — ACTIVITIES OF DAILY LIVING (ADL)
DOING_HOUSEWORK: INDEPENDENT
MANAGING_FINANCES: INDEPENDENT
TAKING_MEDICATION: INDEPENDENT
BATHING: INDEPENDENT
DRESSING: INDEPENDENT
GROCERY_SHOPPING: INDEPENDENT

## 2023-11-16 NOTE — TELEPHONE ENCOUNTER
As pt was checking out today she is requesting an Xray order for her right shoulder  Please advise, thanks!

## 2023-11-16 NOTE — PROGRESS NOTES
"Subjective   Reason for Visit: Maya Haines is an 78 y.o. female here for a Medicare Wellness visit.     Past Medical, Surgical, and Family History reviewed and updated in chart.    Reviewed all medications by prescribing practitioner or clinical pharmacist (such as prescriptions, OTCs, herbal therapies and supplements) and documented in the medical record.    HPI    Patient Care Team:  Ernst Camilo DO as PCP - General  Ernst Camilo DO [facility]     Review of Systems    Objective   Vitals:  /63   Pulse 74   Temp 36.1 °C (97 °F)   Resp 18   Ht 1.6 m (5' 3\")   Wt 69.9 kg (154 lb)   SpO2 98%   BMI 27.28 kg/m²       Physical Exam    Assessment/Plan   Problem List Items Addressed This Visit     Carotid arterial disease (CMS/[facility])    Relevant Medications    clopidogrel (Plavix) 75 mg tablet    GERD (gastroesophageal reflux disease)    Current Assessment & Plan      The reflux esophagitis he has been occurring in a recurrent pattern for years. The course has been without change. The reflux is described as mild to moderate. The patient remains compliant on meds.. The patient takes medications in a stepup stepdown fashion. Denies dysphagia hoarseness or odynophagia... stable and continue meds             Relevant Medications    pantoprazole (ProtoNix) 40 mg EC tablet    Other Relevant Orders    CBC and Auto Differential    Vitamin B12    Hyperlipidemia    Current Assessment & Plan     Patient is also here for follow-up of hyperlipidemia. The most recent measurements for this patient would take it on.. 5/25/23 At that time.. Total cholesterol.127... HDL 46   LDL60    triglycerides   103    .... Associated symptoms do not include chest pain, Cramps with exertion, myalgias or nausea. Current treatment includes statins. By report there is good compliance with treatment. Pertinent medical history includes ?diabetes and hypertension stable and continue meds             Relevant " Medications    amLODIPine (Norvasc) 5 mg tablet    rosuvastatin (Crestor) 40 mg tablet    Other Relevant Orders    Lipid Panel    Primary hypertension    Current Assessment & Plan     Pt is also here for a follow-up visit for HTN. She reports good compliance with medications. Occasionally checks her BP at home, and reports an average BP of 140/70mmHg. She denies chest pain, SOB, headaches, dizziness, lightheadedness.  stable and continue meds             Relevant Medications    alendronate (Fosamax) 70 mg tablet    hydroCHLOROthiazide (HYDRODiuril) 12.5 mg tablet    losartan (Cozaar) 50 mg tablet    Other Relevant Orders    Comprehensive Metabolic Panel    Hypothyroidism    Current Assessment & Plan     HERE FOR RECHK OF THYROIDLast clinic visit was month5 ago.   .tsh was  0.03...Symptoms do not include weight gain, cold intolerance, fatigue, weakness, appetite, hair loss, dry skin    There is no known event that preceded symptom onset.  . Current treatment includes levothyroxine. Report there is good compliance with medical treatment patient states she did decrease thyroid for.  Of time but went back to taking daily we will repeat TSH to determine if needs new dosage of meds         Relevant Medications    levothyroxine (Synthroid, Levoxyl) 125 mcg tablet    Other Relevant Orders    Thyroid Stimulating Hormone    Thyroxine, Free    Osteoarthritis of knee    Relevant Medications    naproxen (Naprosyn) 500 mg tablet    Osteopenia    Relevant Orders    Vitamin D 25-Hydroxy,Total (for eval of Vitamin D levels)   Other Visit Diagnoses     Routine general medical examination at health care facility    -  Primary    Hyperglycemia, unspecified        Relevant Orders    POCT glycosylated hemoglobin (Hb A1C) manually resulted    Diabetes mellitus screening        Relevant Orders    POCT glycosylated hemoglobin (Hb A1C) manually resulted    Asymptomatic menopausal state        Relevant Orders    XR DEXA bone density     Encounter for screening mammogram for breast cancer        Relevant Orders    BI mammo bilateral screening tomosynthesis

## 2023-11-16 NOTE — PROGRESS NOTES
Subjective   Reason for Visit: Maya Haines is an 78 y.o. female here for a Medicare Wellness visit.     Past Medical, Surgical, and Family History reviewed and updated in chart.    Reviewed all medications by prescribing practitioner or clinical pharmacist (such as prescriptions, OTCs, herbal therapies and supplements) and documented in the medical record.    HPI    Patient Care Team:  Ernst Camilo DO as PCP - General  Ernst Camilo DO as PCP - [facility]ributed [facility]     Pt is also here for a follow-up visit for HTN. She reports good compliance with medications. Occasionally checks her BP at home, and reports an average BP of 140/70mmHg. She denies chest pain, SOB, headaches, dizziness, lightheadedness.  Patient is also here for follow-up of hyperlipidemia. The most recent measurements for this patient would take it on.. 5/25/23 At that time.. Total cholesterol.127... HDL 46   LDL60    triglycerides   103    .... Associated symptoms do not include chest pain, Cramps with exertion, myalgias or nausea. Current treatment includes statins. By report there is good compliance with treatment. Pertinent medical history includes ?diabetes and hypertension    The reflux esophagitis he has been occurring in a recurrent pattern for years. The course has been without change. The reflux is described as mild to moderate. The patient remains compliant on meds.. The patient takes medications in a stepup stepdown fashion. Denies dysphagia hoarseness or odynophagia...   HERE FOR RECHK OF THYROIDLast clinic visit was month5 ago.   .tsh was  0.03...Symptoms do not include weight gain, cold intolerance, fatigue, weakness, appetite, hair loss, dry skin    There is no known event that preceded symptom onset.  . Current treatment includes levothyroxine. Report there is good compliance with medical treatment.   Shoulder  pain   right    and hurts  with   certain  movements  .. 8/1o  Review of Systems   Constitutional:  Negative  "for appetite change, fatigue and fever.   Respiratory:  Negative for chest tightness and shortness of breath.    Cardiovascular:  Negative for chest pain, palpitations and leg swelling.   Endocrine: Negative for polydipsia and polyuria.   Genitourinary:  Positive for frequency. Negative for difficulty urinating.   Skin: Negative.    Neurological:  Negative for dizziness, light-headedness, numbness and headaches.     Musculoskeletal: R shoulder pain, aching at night. Current pain 3/10, at night pain is at its worst at 8/10. Per patient, positioning helps to ease pain. Putting pressure on the right elbow makes the pain worse. Shoulder  pain   right    and hurts  with   certain  movements  .. 8/1o      Objective   Vitals:  /63   Pulse 74   Temp 36.1 °C (97 °F)   Resp 18   Ht 1.6 m (5' 3\")   Wt 69.9 kg (154 lb)   SpO2 98%   BMI 27.28 kg/m²       Physical Exam  HENT:      Head: Normocephalic.      Mouth/Throat:      Mouth: Mucous membranes are dry.   Cardiovascular:      Rate and Rhythm: Normal rate.      Heart sounds: Murmur heard.      No gallop.   Abdominal:      General: Abdomen is flat.      Palpations: Abdomen is soft.   Musculoskeletal:         General: Tenderness present.      Cervical back: Neck supple.      Comments: R shoulder, L knee arthralgia   Skin:     General: Skin is warm and dry.      Capillary Refill: Capillary refill takes less than 2 seconds.   Neurological:      General: No focal deficit present.      Mental Status: She is alert and oriented to person, place, and time.           Assessment/Plan   Problem List Items Addressed This Visit       Carotid arterial disease (CMS/[facility])    Relevant Medications    clopidogrel (Plavix) 75 mg tablet    GERD (gastroesophageal reflux disease)    Current Assessment & Plan      The reflux esophagitis he has been occurring in a recurrent pattern for years. The course has been without change. The reflux is described as mild to moderate. The patient " remains compliant on meds.. The patient takes medications in a stepup stepdown fashion. Denies dysphagia hoarseness or odynophagia... stable and continue meds             Relevant Medications    pantoprazole (ProtoNix) 40 mg EC tablet    Other Relevant Orders    CBC and Auto Differential    Vitamin B12    Hyperlipidemia    Current Assessment & Plan     Patient is also here for follow-up of hyperlipidemia. The most recent measurements for this patient would take it on.. 5/25/23 At that time.. Total cholesterol.127... HDL 46   LDL60    triglycerides   103    .... Associated symptoms do not include chest pain, Cramps with exertion, myalgias or nausea. Current treatment includes statins. By report there is good compliance with treatment. Pertinent medical history includes ?diabetes and hypertension stable and continue meds             Relevant Medications    amLODIPine (Norvasc) 5 mg tablet    rosuvastatin (Crestor) 40 mg tablet    Other Relevant Orders    Lipid Panel    Primary hypertension    Current Assessment & Plan     Pt is also here for a follow-up visit for HTN. She reports good compliance with medications. Occasionally checks her BP at home, and reports an average BP of 140/70mmHg. She denies chest pain, SOB, headaches, dizziness, lightheadedness.  stable and continue meds             Relevant Medications    alendronate (Fosamax) 70 mg tablet    hydroCHLOROthiazide (HYDRODiuril) 12.5 mg tablet    losartan (Cozaar) 50 mg tablet    Other Relevant Orders    Comprehensive Metabolic Panel    Hypothyroidism    Current Assessment & Plan     HERE FOR RECHK OF THYROIDLast clinic visit was month5 ago.   .tsh was  0.03...Symptoms do not include weight gain, cold intolerance, fatigue, weakness, appetite, hair loss, dry skin    There is no known event that preceded symptom onset.  . Current treatment includes levothyroxine. Report there is good compliance with medical treatment patient states she did decrease thyroid for.   Of time but went back to taking daily we will repeat TSH to determine if needs new dosage of meds         Relevant Medications    levothyroxine (Synthroid, Levoxyl) 125 mcg tablet    Other Relevant Orders    Thyroid Stimulating Hormone    Thyroxine, Free    Osteoarthritis of knee    Relevant Medications    naproxen (Naprosyn) 500 mg tablet    Osteopenia - Primary    Relevant Orders    Vitamin D 25-Hydroxy,Total (for eval of Vitamin D levels)     MEDICARE SUMMARY  Cervical cancer: Screening --women aged 21 to  65..    /. It is no longer advisable in light of your history of normal Paps  HIV infection: Screening:  Those adults at risk H 15-65 years   Not indicated   High blood pressure: Screening  and home monitoring... Adults with history and at risk  Patient given my blood pressure log per [employer].  Advised to monitor and instructed diet i.e. Mediterranean and given handout  Advised to return for nursing visit blood pressure track and 2 to 4 weeks   BRCA 1/2- related cancer, screening, and counseling--- women with a personal or family history of breast, ovarian, tubal, or peritoneal cancer or and he has history associated with BRCA 1/2 gene mutation  Breast cancer: Preventative medications--women at increased risk for breast cancer  Breast cancer: Screening with   mammography..--Women aged 50-74 years  Diabetes mellitus (type II (and abnormal blood glucose: Screening--adults H 40-70 years who are overweight or obese  Falls prevention in community swelling older adults:.. Interventions--- adults 65 or older  Healthful diet and physical activity for C VD disease prevention: Counseling--adults with CVD risk factors  Hepatitis C virus infection: Screening--adults at high risk and adults born between 1945 and 1965 Not indicated   Lung cancer: Screening --adults ages 55-80 with a 30 pack year smoking history and currently smoke or have quit within the past 15 years   Not indicated   Osteoporosis to  prevent fractures: Screening close post menopausal women  younger than 65 years at increased risk of osteoporosis  Osteoporosis to prevent fractures: Screening women 65 and older  [facility]   bmp  Statin use for the primary prevention of CVD: Preventative medicine--adults  40-75 years with no history of CVD, one or more CVD risk factors, and a calculated 10 year CVD event risk of 10% or greater   on meds  Immunization  updates...

## 2023-11-16 NOTE — ASSESSMENT & PLAN NOTE
Pt is also here for a follow-up visit for HTN. She reports good compliance with medications. Occasionally checks her BP at home, and reports an average BP of 140/70mmHg. She denies chest pain, SOB, headaches, dizziness, lightheadedness.  stable and continue meds

## 2023-11-16 NOTE — ASSESSMENT & PLAN NOTE
Patient is also here for follow-up of hyperlipidemia. The most recent measurements for this patient would take it on.. 5/25/23 At that time.. Total cholesterol.127... HDL 46   LDL60    triglycerides   103    .... Associated symptoms do not include chest pain, Cramps with exertion, myalgias or nausea. Current treatment includes statins. By report there is good compliance with treatment. Pertinent medical history includes ?diabetes and hypertension stable and continue meds

## 2023-11-16 NOTE — ASSESSMENT & PLAN NOTE
HERE FOR RECHK OF THYROIDLast clinic visit was month5 ago.   .tsh was  0.03...Symptoms do not include weight gain, cold intolerance, fatigue, weakness, appetite, hair loss, dry skin    There is no known event that preceded symptom onset.  . Current treatment includes levothyroxine. Report there is good compliance with medical treatment patient states she did decrease thyroid for.  Of time but went back to taking daily we will repeat TSH to determine if needs new dosage of meds

## 2023-11-16 NOTE — ASSESSMENT & PLAN NOTE
The reflux esophagitis he has been occurring in a recurrent pattern for years. The course has been without change. The reflux is described as mild to moderate. The patient remains compliant on meds.. The patient takes medications in a stepup stepdown fashion. Denies dysphagia hoarseness or odynophagia... stable and continue meds

## 2023-11-17 DIAGNOSIS — E78.49 OTHER HYPERLIPIDEMIA: ICD-10-CM

## 2023-11-17 RX ORDER — AMLODIPINE BESYLATE 5 MG/1
5 TABLET ORAL
Qty: 1 TABLET | Refills: 3 | OUTPATIENT
Start: 2023-11-17

## 2023-11-17 RX ORDER — AMLODIPINE BESYLATE 5 MG/1
5 TABLET ORAL DAILY
Qty: 90 TABLET | Refills: 3 | Status: SHIPPED | OUTPATIENT
Start: 2023-11-17 | End: 2023-11-28 | Stop reason: SDUPTHER

## 2023-11-20 ENCOUNTER — APPOINTMENT (OUTPATIENT)
Dept: PRIMARY CARE | Facility: CLINIC | Age: 78
End: 2023-11-20

## 2023-11-27 ENCOUNTER — ANCILLARY PROCEDURE (OUTPATIENT)
Dept: RADIOLOGY | Facility: CLINIC | Age: 78
End: 2023-11-27
Payer: MEDICARE

## 2023-11-27 ENCOUNTER — LAB (OUTPATIENT)
Dept: LAB | Facility: LAB | Age: 78
End: 2023-11-27
Payer: MEDICARE

## 2023-11-27 ENCOUNTER — TELEPHONE (OUTPATIENT)
Dept: PRIMARY CARE | Facility: CLINIC | Age: 78
End: 2023-11-27

## 2023-11-27 DIAGNOSIS — R00.2 PALPITATIONS: ICD-10-CM

## 2023-11-27 DIAGNOSIS — D50.8 OTHER IRON DEFICIENCY ANEMIA: ICD-10-CM

## 2023-11-27 DIAGNOSIS — E78.49 OTHER HYPERLIPIDEMIA: ICD-10-CM

## 2023-11-27 DIAGNOSIS — M85.80 OSTEOPENIA, UNSPECIFIED LOCATION: ICD-10-CM

## 2023-11-27 DIAGNOSIS — M25.511 CHRONIC RIGHT SHOULDER PAIN: ICD-10-CM

## 2023-11-27 DIAGNOSIS — D64.9 POSTOPERATIVE ANEMIA: ICD-10-CM

## 2023-11-27 DIAGNOSIS — E03.8 OTHER SPECIFIED HYPOTHYROIDISM: ICD-10-CM

## 2023-11-27 DIAGNOSIS — K21.00 GASTROESOPHAGEAL REFLUX DISEASE WITH ESOPHAGITIS, UNSPECIFIED WHETHER HEMORRHAGE: ICD-10-CM

## 2023-11-27 DIAGNOSIS — G89.29 CHRONIC RIGHT SHOULDER PAIN: ICD-10-CM

## 2023-11-27 DIAGNOSIS — I10 PRIMARY HYPERTENSION: ICD-10-CM

## 2023-11-27 LAB
25(OH)D3 SERPL-MCNC: 45 NG/ML (ref 30–100)
ALBUMIN SERPL BCP-MCNC: 4.6 G/DL (ref 3.4–5)
ALP SERPL-CCNC: 49 U/L (ref 33–136)
ALT SERPL W P-5'-P-CCNC: 16 U/L (ref 7–45)
ANION GAP SERPL CALC-SCNC: 15 MMOL/L (ref 10–20)
AST SERPL W P-5'-P-CCNC: 19 U/L (ref 9–39)
BASOPHILS # BLD AUTO: 0.06 X10*3/UL (ref 0–0.1)
BASOPHILS NFR BLD AUTO: 0.8 %
BILIRUB SERPL-MCNC: 0.5 MG/DL (ref 0–1.2)
BUN SERPL-MCNC: 20 MG/DL (ref 6–23)
CALCIUM SERPL-MCNC: 9.8 MG/DL (ref 8.6–10.3)
CHLORIDE SERPL-SCNC: 103 MMOL/L (ref 98–107)
CHOLEST SERPL-MCNC: 149 MG/DL (ref 0–199)
CHOLESTEROL/HDL RATIO: 2.8
CO2 SERPL-SCNC: 27 MMOL/L (ref 21–32)
CREAT SERPL-MCNC: 0.84 MG/DL (ref 0.5–1.05)
EOSINOPHIL # BLD AUTO: 0.15 X10*3/UL (ref 0–0.4)
EOSINOPHIL NFR BLD AUTO: 1.9 %
ERYTHROCYTE [DISTWIDTH] IN BLOOD BY AUTOMATED COUNT: 12.1 % (ref 11.5–14.5)
GFR SERPL CREATININE-BSD FRML MDRD: 71 ML/MIN/1.73M*2
GLUCOSE SERPL-MCNC: 111 MG/DL (ref 74–99)
HCT VFR BLD AUTO: 37.5 % (ref 36–46)
HDLC SERPL-MCNC: 53.9 MG/DL
HGB BLD-MCNC: 12.4 G/DL (ref 12–16)
IMM GRANULOCYTES # BLD AUTO: 0.01 X10*3/UL (ref 0–0.5)
IMM GRANULOCYTES NFR BLD AUTO: 0.1 % (ref 0–0.9)
LDLC SERPL CALC-MCNC: 73 MG/DL
LYMPHOCYTES # BLD AUTO: 1.32 X10*3/UL (ref 0.8–3)
LYMPHOCYTES NFR BLD AUTO: 16.5 %
MAGNESIUM SERPL-MCNC: 1.91 MG/DL (ref 1.6–2.4)
MCH RBC QN AUTO: 30 PG (ref 26–34)
MCHC RBC AUTO-ENTMCNC: 33.1 G/DL (ref 32–36)
MCV RBC AUTO: 91 FL (ref 80–100)
MONOCYTES # BLD AUTO: 0.73 X10*3/UL (ref 0.05–0.8)
MONOCYTES NFR BLD AUTO: 9.1 %
NEUTROPHILS # BLD AUTO: 5.71 X10*3/UL (ref 1.6–5.5)
NEUTROPHILS NFR BLD AUTO: 71.6 %
NON HDL CHOLESTEROL: 95 MG/DL (ref 0–149)
NRBC BLD-RTO: 0 /100 WBCS (ref 0–0)
PLATELET # BLD AUTO: 310 X10*3/UL (ref 150–450)
POTASSIUM SERPL-SCNC: 3.8 MMOL/L (ref 3.5–5.3)
PROT SERPL-MCNC: 7.2 G/DL (ref 6.4–8.2)
RBC # BLD AUTO: 4.13 X10*6/UL (ref 4–5.2)
SODIUM SERPL-SCNC: 141 MMOL/L (ref 136–145)
T4 FREE SERPL-MCNC: 1.41 NG/DL (ref 0.61–1.12)
TRIGL SERPL-MCNC: 110 MG/DL (ref 0–149)
TSH SERPL-ACNC: 0.22 MIU/L (ref 0.44–3.98)
VIT B12 SERPL-MCNC: 216 PG/ML (ref 211–911)
VLDL: 22 MG/DL (ref 0–40)
WBC # BLD AUTO: 8 X10*3/UL (ref 4.4–11.3)

## 2023-11-27 PROCEDURE — 73030 X-RAY EXAM OF SHOULDER: CPT | Mod: RIGHT SIDE | Performed by: RADIOLOGY

## 2023-11-27 PROCEDURE — 80061 LIPID PANEL: CPT

## 2023-11-27 PROCEDURE — 73030 X-RAY EXAM OF SHOULDER: CPT | Mod: RT

## 2023-11-27 PROCEDURE — 84439 ASSAY OF FREE THYROXINE: CPT

## 2023-11-27 PROCEDURE — 85025 COMPLETE CBC W/AUTO DIFF WBC: CPT

## 2023-11-27 PROCEDURE — 36415 COLL VENOUS BLD VENIPUNCTURE: CPT

## 2023-11-27 PROCEDURE — 82607 VITAMIN B-12: CPT

## 2023-11-27 PROCEDURE — 82306 VITAMIN D 25 HYDROXY: CPT

## 2023-11-27 PROCEDURE — 80053 COMPREHEN METABOLIC PANEL: CPT

## 2023-11-27 PROCEDURE — 83735 ASSAY OF MAGNESIUM: CPT

## 2023-11-27 PROCEDURE — 84443 ASSAY THYROID STIM HORMONE: CPT

## 2023-11-27 NOTE — TELEPHONE ENCOUNTER
Please Clarify and resend the rx for Amlodipine 5 mg.   One set of directions say take 1 po daily while the other says 1po q 3 months, they wont' fill until resent   Per [facility]

## 2023-11-28 DIAGNOSIS — E05.90 HYPERTHYROIDISM: Primary | ICD-10-CM

## 2023-11-28 DIAGNOSIS — E78.49 OTHER HYPERLIPIDEMIA: ICD-10-CM

## 2023-11-28 RX ORDER — AMLODIPINE BESYLATE 5 MG/1
5 TABLET ORAL DAILY
Qty: 90 TABLET | Refills: 3 | Status: SHIPPED | OUTPATIENT
Start: 2023-11-28 | End: 2024-05-20 | Stop reason: SDUPTHER

## 2023-11-28 NOTE — TELEPHONE ENCOUNTER
The Rx from 11/17/23 is the 1 in question.   There are 2 different directions   They are requesting you verify

## 2023-12-27 ENCOUNTER — ANCILLARY PROCEDURE (OUTPATIENT)
Dept: RADIOLOGY | Facility: CLINIC | Age: 78
End: 2023-12-27
Payer: COMMERCIAL

## 2023-12-27 DIAGNOSIS — M17.32 UNILATERAL POST-TRAUMATIC OSTEOARTHRITIS, LEFT KNEE: ICD-10-CM

## 2023-12-27 DIAGNOSIS — T84.84XA PAIN DUE TO INTERNAL ORTHOPEDIC PROSTHETIC DEVICES, IMPLANTS AND GRAFTS, INITIAL ENCOUNTER (CMS-HCC): ICD-10-CM

## 2023-12-27 DIAGNOSIS — S82.035A NONDISPLACED TRANSVERSE FRACTURE OF LEFT PATELLA, INITIAL ENCOUNTER FOR CLOSED FRACTURE: ICD-10-CM

## 2023-12-27 PROCEDURE — 73721 MRI JNT OF LWR EXTRE W/O DYE: CPT | Mod: LEFT SIDE | Performed by: RADIOLOGY

## 2023-12-27 PROCEDURE — 73721 MRI JNT OF LWR EXTRE W/O DYE: CPT | Mod: LT

## 2024-01-25 ENCOUNTER — LAB (OUTPATIENT)
Dept: LAB | Facility: LAB | Age: 79
End: 2024-01-25
Payer: COMMERCIAL

## 2024-01-25 DIAGNOSIS — E03.9 HYPOTHYROIDISM, UNSPECIFIED TYPE: Primary | ICD-10-CM

## 2024-01-25 DIAGNOSIS — E05.90 HYPERTHYROIDISM: ICD-10-CM

## 2024-01-25 LAB — TSH SERPL-ACNC: 0.07 MIU/L (ref 0.44–3.98)

## 2024-01-25 PROCEDURE — 36415 COLL VENOUS BLD VENIPUNCTURE: CPT

## 2024-01-25 PROCEDURE — 84443 ASSAY THYROID STIM HORMONE: CPT

## 2024-01-25 NOTE — PROGRESS NOTES
Patient remains too much thyroid medication will decrease dosage by 1 pill weekly repeat TSH in 8 weeks

## 2024-05-14 ENCOUNTER — OFFICE VISIT (OUTPATIENT)
Dept: CARDIOLOGY | Facility: CLINIC | Age: 79
End: 2024-05-14
Payer: COMMERCIAL

## 2024-05-14 VITALS
BODY MASS INDEX: 29.26 KG/M2 | DIASTOLIC BLOOD PRESSURE: 54 MMHG | HEART RATE: 70 BPM | WEIGHT: 159 LBS | HEIGHT: 62 IN | SYSTOLIC BLOOD PRESSURE: 118 MMHG

## 2024-05-14 DIAGNOSIS — I10 PRIMARY HYPERTENSION: Primary | ICD-10-CM

## 2024-05-14 DIAGNOSIS — I35.2 NONRHEUMATIC AORTIC INSUFFICIENCY WITH AORTIC STENOSIS: ICD-10-CM

## 2024-05-14 DIAGNOSIS — E78.2 MIXED HYPERLIPIDEMIA: ICD-10-CM

## 2024-05-14 PROBLEM — U07.1 COVID-19: Status: RESOLVED | Noted: 2023-01-26 | Resolved: 2024-05-14

## 2024-05-14 PROBLEM — I65.23 ASYMPTOMATIC BILATERAL CAROTID ARTERY STENOSIS: Status: RESOLVED | Noted: 2023-01-26 | Resolved: 2024-05-14

## 2024-05-14 PROBLEM — R00.2 PALPITATIONS: Status: RESOLVED | Noted: 2023-01-26 | Resolved: 2024-05-14

## 2024-05-14 PROBLEM — I65.29 CAROTID STENOSIS, ASYMPTOMATIC: Status: RESOLVED | Noted: 2023-01-26 | Resolved: 2024-05-14

## 2024-05-14 PROBLEM — S61.219A FINGER LACERATION: Status: RESOLVED | Noted: 2023-01-26 | Resolved: 2024-05-14

## 2024-05-14 PROBLEM — I95.81 HYPOTENSION AFTER PROCEDURE: Status: RESOLVED | Noted: 2023-07-18 | Resolved: 2024-05-14

## 2024-05-14 PROBLEM — I77.9 CAROTID ARTERIAL DISEASE (CMS-HCC): Status: RESOLVED | Noted: 2023-01-26 | Resolved: 2024-05-14

## 2024-05-14 PROBLEM — J96.01 ACUTE RESPIRATORY FAILURE WITH HYPOXIA (MULTI): Status: RESOLVED | Noted: 2023-05-05 | Resolved: 2024-05-14

## 2024-05-14 PROBLEM — I35.9 AORTIC VALVE DISEASE: Status: RESOLVED | Noted: 2023-01-26 | Resolved: 2024-05-14

## 2024-05-14 PROBLEM — I65.23 BILATERAL CAROTID ARTERY STENOSIS: Status: ACTIVE | Noted: 2024-05-14

## 2024-05-14 PROBLEM — E86.1 HYPOVOLEMIA: Status: RESOLVED | Noted: 2023-07-19 | Resolved: 2024-05-14

## 2024-05-14 PROBLEM — J98.11 ATELECTASIS: Status: RESOLVED | Noted: 2023-05-24 | Resolved: 2024-05-14

## 2024-05-14 PROBLEM — R01.1 SYSTOLIC MURMUR: Status: RESOLVED | Noted: 2023-01-26 | Resolved: 2024-05-14

## 2024-05-14 PROBLEM — I65.29 ARTERIOSCLEROSIS OF CAROTID ARTERY: Status: RESOLVED | Noted: 2023-01-26 | Resolved: 2024-05-14

## 2024-05-14 PROBLEM — G89.18 ACUTE POST-OPERATIVE PAIN: Status: RESOLVED | Noted: 2023-07-18 | Resolved: 2024-05-14

## 2024-05-14 PROCEDURE — 1036F TOBACCO NON-USER: CPT | Performed by: INTERNAL MEDICINE

## 2024-05-14 PROCEDURE — 99213 OFFICE O/P EST LOW 20 MIN: CPT | Performed by: INTERNAL MEDICINE

## 2024-05-14 PROCEDURE — 1159F MED LIST DOCD IN RCRD: CPT | Performed by: INTERNAL MEDICINE

## 2024-05-14 PROCEDURE — 3074F SYST BP LT 130 MM HG: CPT | Performed by: INTERNAL MEDICINE

## 2024-05-14 PROCEDURE — 3078F DIAST BP <80 MM HG: CPT | Performed by: INTERNAL MEDICINE

## 2024-05-14 RX ORDER — LANOLIN ALCOHOL/MO/W.PET/CERES
1000 CREAM (GRAM) TOPICAL DAILY
COMMUNITY

## 2024-05-14 RX ORDER — BACLOFEN 20 MG
500 TABLET ORAL ONCE
COMMUNITY

## 2024-05-14 NOTE — PATIENT INSTRUCTIONS
Please bring all medicines, vitamins, and herbal supplements with you in original bottles to every appointment!    Prescriptions will not be filled unless you are compliant with your follow up appointments or have a follow up appointment scheduled as per instruction of your physician. Refills should be requested at the time of your visit.

## 2024-05-14 NOTE — PROGRESS NOTES
Patient:  Maya Haines  YOB: 1945  MRN: [id number]       Impression/Plan:     Diagnoses and all orders for this visit:  Primary hypertension  Nonrheumatic aortic insufficiency with aortic stenosis  -     Clinically without exertional dyspnea but leads a fairly sedentary lifestyle.  -     Exam suggest moderate aortic stenosis.  Will plan to reassess in 6 months with echo at that time as last echo was over 2 years ago.    Mixed hyperlipidemia  -    Excellent control on the current dose of rosuvastatin continue same she has no adverse effects      Chief Complaint/Active Symptoms:       Maya Haines is a 79 y.o. female who presents with hypertension, hyperlipidemia carotid artery disease with left carotid stent 2023, angiographically normal coronary arteries 2003, congenital VSD with spontaneous closure as an adult, arthritis and hypothyroidism.       She was in this office 5/12/2023 seeing Dr. Roby Mario.  She has hypertension, aortic valve disease and VSD with spontaneous closure of the VSD.  Also with known carotid artery disease followed by Dr. Terry Fisher at [facility].  At that time no cardiovascular symptoms.          7/18/2023 left carotid stenting at [facility] Dr. Terry Fisher had transient postop hypotension and some GI symptoms.  Postop check without neurologic compromise.    Seen in this office 11/15/2023 by Kaela Castellon NP.  No specific cardiovascular complaints at that time.  Mild aortic stenosis only    Had vascular surgery follow-up 2/28/2024 which time she was doing well duplex showing widely patent stent.  Right internal carotid less than 40%.    November 2023 cholesterol 149 HDL 54 LDL 73 triglycerides 110    She denies angina, dyspnea, palpitation, edema, lightheadedness or syncope.  She has had no symptoms of claudication or neurologic deterioration.  There have been no hospitalizations or emergency room visits since last office visit.            Review of Systems:  Unremarkable except as noted above    Meds     Current Outpatient Medications   Medication Instructions    alendronate (FOSAMAX) 70 mg, oral, Once Weekly    amLODIPine (NORVASC) 5 mg, oral, Daily    ASPIRIN ORAL 81 mg, oral, Daily    cyanocobalamin (VITAMIN B-12) 1,000 mcg, oral, Daily    hydroCHLOROthiazide (MICROZIDE) 12.5 mg, oral, Daily    levothyroxine (SYNTHROID, LEVOXYL) 125 mcg, oral, Daily    losartan (COZAAR) 50 mg, oral, Daily    magnesium oxide 500 mg, oral, Once    naproxen (NAPROSYN) 500 mg, oral, Daily PRN    pantoprazole (PROTONIX) 40 mg, oral, Daily RT    rosuvastatin (CRESTOR) 40 mg, oral, Daily    VIT B COMP NO.3-FOLIC-C-BIOTIN ORAL 1 tablet, oral, Daily with breakfast    ZINC ACETATE ORAL Mouth/Throat        Allergies     Allergies   Allergen Reactions    Ace Inhibitors Cough    Codeine Hives    Lisinopril Cough         Annotated Problems     Specialty Problems          Cardiology Problems    Aortic insufficiency with aortic stenosis     SEPT 2022 echo LVEF 60%  1+ AI with aortic stenosis Vmax 2.16 M/sec  mean 11         Hyperlipidemia     2003 Cath with normal coronary arteries         Primary hypertension    Shortness of breath on exertion    Ventricular septal defect ([facility])     Spontaneous closure         Bilateral carotid artery stenosis     Feb 2024 Duplex shows widely patent Left stent, <40% REILLY  7/18/2023 left carotid stenting at [facility] Dr. Terry Fisher              Problem List     Patient Active Problem List    Diagnosis Date Noted    Bilateral carotid artery stenosis 05/14/2024    Elevated glucose 06/20/2023    Arthrofibrosis of knee joint, left 04/28/2023    Closed nondisplaced transverse fracture of left patella 04/28/2023    Abnormal mammogram 01/26/2023    Aortic insufficiency with aortic stenosis 01/26/2023    Asthma ([facility]) 01/26/2023    Change in bowel habit 01/26/2023    Fibromyalgia 01/26/2023    Foot pain 01/26/2023    GERD (gastroesophageal reflux disease)  "01/26/2023    Hyperlipidemia 01/26/2023    Primary hypertension 01/26/2023    Hyperthyroidism 01/26/2023    Hypothyroidism 01/26/2023    Lumbar spinal stenosis 01/26/2023    Osteoarthritis of knee 01/26/2023    Osteopenia 01/26/2023    Osteoporosis 01/26/2023    Shortness of breath on exertion 01/26/2023    Sinobronchitis 01/26/2023    Stomatitis 01/26/2023    Ventricular septal defect ([facility]-H[facility]) 01/26/2023    Vitamin D deficiency 01/26/2023    Vulvar dermatitis 01/26/2023    ACE-inhibitor cough 01/26/2023    Overweight with body mass index (BMI) of 27 to 27.9 in adult 01/26/2023    Senile cataract 06/03/2013       Objective:     Vitals:    05/14/24 1554   BP: 118/54   BP Location: Left arm   Patient Position: Sitting   Pulse: 70   Weight: 72.1 kg (159 lb)   Height: 1.575 m (5' 2\")      Wt Readings from Last 4 Encounters:   05/14/24 72.1 kg (159 lb)   12/27/23 68 kg (150 lb)   11/16/23 69.9 kg (154 lb)   11/15/23 69.4 kg (153 lb)           LAB:     Lab Results   Component Value Date    WBC 8.0 11/27/2023    HGB 12.4 11/27/2023    HCT 37.5 11/27/2023     11/27/2023    CHOL 149 11/27/2023    TRIG 110 11/27/2023    HDL 53.9 11/27/2023    ALT 16 11/27/2023    AST 19 11/27/2023     11/27/2023    K 3.8 11/27/2023     11/27/2023    CREATININE 0.84 11/27/2023    BUN 20 11/27/2023    CO2 27 11/27/2023    TSH 0.07 (L) 01/25/2024    HGBA1C 6.3 11/16/2023       Diagnostic Studies:     Patient was never admitted.      Radiology:     Transthoracic Echo (TTE) Complete    (Results Pending)       Physical Exam     General Appearance: alert and oriented to person, place and time, in no acute distress  Cardiovascular: normal rate, regular rhythm, normal S1 and S2, II/VI mid peak aortic stenosis murmur, No rubs, clicks, or gallops,  no JVD  Pulmonary/Chest: clear to auscultation bilaterally- no wheezes, rales or rhonchi, normal air movement, no respiratory distress  Abdomen: soft, non-tender, non-distended, normal " bowel sounds, no masses   Extremities: no cyanosis, clubbing or edema  Skin: warm and dry, no rash or erythema  Eyes: EOMI  Neck: supple and non-tender without mass, no thyromegaly   Neurological: alert, oriented, normal speech, no focal findings or movement disorder noted  Vascular:  pulses 2+ bilat carotid bruits          Scribe Attestation  By signing my name below, I, Fatemeh Shore CMA   , Scribe   attest that this documentation has been prepared under the direction and in the presence of Frank Fuentes MD.

## 2024-05-20 ENCOUNTER — OFFICE VISIT (OUTPATIENT)
Dept: PRIMARY CARE | Facility: CLINIC | Age: 79
End: 2024-05-20
Payer: COMMERCIAL

## 2024-05-20 VITALS
HEART RATE: 82 BPM | OXYGEN SATURATION: 95 % | SYSTOLIC BLOOD PRESSURE: 121 MMHG | TEMPERATURE: 97 F | BODY MASS INDEX: 28.89 KG/M2 | RESPIRATION RATE: 18 BRPM | WEIGHT: 157 LBS | HEIGHT: 62 IN | DIASTOLIC BLOOD PRESSURE: 74 MMHG

## 2024-05-20 DIAGNOSIS — Z13.1 DIABETES MELLITUS SCREENING: ICD-10-CM

## 2024-05-20 DIAGNOSIS — R73.9 HYPERGLYCEMIA, UNSPECIFIED: ICD-10-CM

## 2024-05-20 DIAGNOSIS — Z00.00 ROUTINE GENERAL MEDICAL EXAMINATION AT HEALTH CARE FACILITY: Primary | ICD-10-CM

## 2024-05-20 DIAGNOSIS — Z12.31 ENCOUNTER FOR SCREENING MAMMOGRAM FOR BREAST CANCER: ICD-10-CM

## 2024-05-20 DIAGNOSIS — I10 PRIMARY HYPERTENSION: ICD-10-CM

## 2024-05-20 DIAGNOSIS — M85.80 OSTEOPENIA, UNSPECIFIED LOCATION: ICD-10-CM

## 2024-05-20 DIAGNOSIS — K21.00 GASTROESOPHAGEAL REFLUX DISEASE WITH ESOPHAGITIS, UNSPECIFIED WHETHER HEMORRHAGE: ICD-10-CM

## 2024-05-20 DIAGNOSIS — M17.0 PRIMARY OSTEOARTHRITIS OF BOTH KNEES: ICD-10-CM

## 2024-05-20 DIAGNOSIS — Z78.0 ASYMPTOMATIC MENOPAUSAL STATE: ICD-10-CM

## 2024-05-20 DIAGNOSIS — E03.8 OTHER SPECIFIED HYPOTHYROIDISM: ICD-10-CM

## 2024-05-20 DIAGNOSIS — E78.49 OTHER HYPERLIPIDEMIA: ICD-10-CM

## 2024-05-20 LAB — POC HEMOGLOBIN A1C: 6.1 % (ref 4.2–6.5)

## 2024-05-20 PROCEDURE — 83036 HEMOGLOBIN GLYCOSYLATED A1C: CPT | Performed by: FAMILY MEDICINE

## 2024-05-20 PROCEDURE — 1170F FXNL STATUS ASSESSED: CPT | Performed by: FAMILY MEDICINE

## 2024-05-20 PROCEDURE — 1160F RVW MEDS BY RX/DR IN RCRD: CPT | Performed by: FAMILY MEDICINE

## 2024-05-20 PROCEDURE — 1036F TOBACCO NON-USER: CPT | Performed by: FAMILY MEDICINE

## 2024-05-20 PROCEDURE — 1124F ACP DISCUSS-NO DSCNMKR DOCD: CPT | Performed by: FAMILY MEDICINE

## 2024-05-20 PROCEDURE — 3078F DIAST BP <80 MM HG: CPT | Performed by: FAMILY MEDICINE

## 2024-05-20 PROCEDURE — G0439 PPPS, SUBSEQ VISIT: HCPCS | Performed by: FAMILY MEDICINE

## 2024-05-20 PROCEDURE — 1159F MED LIST DOCD IN RCRD: CPT | Performed by: FAMILY MEDICINE

## 2024-05-20 PROCEDURE — 3074F SYST BP LT 130 MM HG: CPT | Performed by: FAMILY MEDICINE

## 2024-05-20 RX ORDER — ALENDRONATE SODIUM 70 MG/1
70 TABLET ORAL
Qty: 90 TABLET | Refills: 2 | Status: SHIPPED | OUTPATIENT
Start: 2024-05-20

## 2024-05-20 RX ORDER — LOSARTAN POTASSIUM 50 MG/1
50 TABLET ORAL DAILY
Qty: 90 TABLET | Refills: 3 | Status: SHIPPED | OUTPATIENT
Start: 2024-05-20

## 2024-05-20 RX ORDER — HYDROCHLOROTHIAZIDE 12.5 MG/1
12.5 TABLET ORAL DAILY
Qty: 90 TABLET | Refills: 2 | Status: SHIPPED | OUTPATIENT
Start: 2024-05-20

## 2024-05-20 RX ORDER — ROSUVASTATIN CALCIUM 40 MG/1
40 TABLET, COATED ORAL DAILY
Qty: 90 TABLET | Refills: 3 | Status: SHIPPED | OUTPATIENT
Start: 2024-05-20

## 2024-05-20 RX ORDER — AMLODIPINE BESYLATE 5 MG/1
5 TABLET ORAL DAILY
Qty: 90 TABLET | Refills: 3 | Status: SHIPPED | OUTPATIENT
Start: 2024-05-20 | End: 2025-05-20

## 2024-05-20 RX ORDER — PANTOPRAZOLE SODIUM 40 MG/1
40 TABLET, DELAYED RELEASE ORAL
Qty: 90 TABLET | Refills: 3 | Status: SHIPPED | OUTPATIENT
Start: 2024-05-20 | End: 2025-05-15

## 2024-05-20 RX ORDER — LEVOTHYROXINE SODIUM 125 UG/1
125 TABLET ORAL DAILY
Qty: 90 TABLET | Refills: 2 | Status: SHIPPED | OUTPATIENT
Start: 2024-05-20

## 2024-05-20 ASSESSMENT — ENCOUNTER SYMPTOMS
LOSS OF SENSATION IN FEET: 0
DEPRESSION: 0
OCCASIONAL FEELINGS OF UNSTEADINESS: 0

## 2024-05-20 ASSESSMENT — ACTIVITIES OF DAILY LIVING (ADL)
TAKING_MEDICATION: INDEPENDENT
BATHING: INDEPENDENT
GROCERY_SHOPPING: INDEPENDENT
DOING_HOUSEWORK: INDEPENDENT
MANAGING_FINANCES: INDEPENDENT
DRESSING: INDEPENDENT

## 2024-05-20 ASSESSMENT — PATIENT HEALTH QUESTIONNAIRE - PHQ9
SUM OF ALL RESPONSES TO PHQ9 QUESTIONS 1 AND 2: 0
1. LITTLE INTEREST OR PLEASURE IN DOING THINGS: NOT AT ALL
2. FEELING DOWN, DEPRESSED OR HOPELESS: NOT AT ALL

## 2024-05-20 NOTE — PROGRESS NOTES
Subjective   Patient ID: Maya Haines is a 79 y.o. female who presents for Medicare Annual Wellness Visit Subsequent.  HPI  HERE FOR RECHK OF THYROIDLast clinic visit was month (S) ago. Symptoms do not include weight gain, cold intolerance, fatigue, weakness, appetite, hair loss, dry skin    There is no known event that preceded symptom onset.  . Current treatment includes levothyroxine.... dropped THYROID BY   ONE PILLL WEEKL Y (  JANUARY)Report there is good compliance with medical treatment. ,  Patient is also here for follow-up of hypertension.. Symptoms do not include headache confusion visual disturbance dizziness shortness of breath chest pain syncope or palpitations. Recent blood pressure patient has not been checking. By report there is good compliance with treatment. Pertinent medical history includes HYPOTHYROID//hyperlipidemia    The reflux esophagitis he has been occurring in a recurrent pattern for years. The course has been without change. The reflux is described as  moderate. The patient remains compliant on meds.. The patient takes medications in a INTERMITTENTLY  fashion. Denies dysphagia hoarseness or odynophagia...    Lipids with follow-up and11/27/2023 with cholesterol 149 HDL 53 LDL 73 triglycerides 110 and liver testing with normal LFTs all dated 11/27  Review of Systems  All other  pertinent  systems reviewed and are negative except  those  mentioned  in HPI   Objective   Physical Exam  Vitals reviewed.   Constitutional:       Appearance: Normal appearance.   HENT:      Head: Normocephalic.      Right Ear: External ear normal.      Left Ear: External ear normal.      Nose: Nose normal.      Mouth/Throat:      Mouth: Mucous membranes are moist.   Eyes:      Conjunctiva/sclera: Conjunctivae normal.   Cardiovascular:      Rate and Rhythm: Regular rhythm.      Heart sounds: Normal heart sounds.   Pulmonary:      Effort: Pulmonary effort is normal.      Breath sounds: Normal breath sounds.    Abdominal:      General: Bowel sounds are normal.      Palpations: Abdomen is soft.   Musculoskeletal:         General: Normal range of motion.      Cervical back: Neck supple.   Skin:     General: Skin is warm and dry.   Neurological:      General: No focal deficit present.      Mental Status: She is alert and oriented to person, place, and time.   Psychiatric:         Behavior: Behavior normal.         Judgment: Judgment normal.         Labs  Saw Dr. Fuentes 5/14 with discussion regarding moderate aortic stenosis with repeat echo and review of left carotid stenting at [facility] 7/23.  Carotid duplex right less than 40% per reports      Assessment/Plan   Problem List Items Addressed This Visit       GERD (gastroesophageal reflux disease)     Reflux stable with current regimen and advised B12 supplementation will continue to monitor         Relevant Medications    pantoprazole (ProtoNix) 40 mg EC tablet    Other Relevant Orders    CBC and Auto Differential    Vitamin B12    Hyperlipidemia     Cholesterol numbers reviewed we will repeat labs to ensure stabilityLDL was 73 dated 11/23         Relevant Medications    amLODIPine (Norvasc) 5 mg tablet    rosuvastatin (Crestor) 40 mg tablet    Other Relevant Orders    Lipid Panel    Primary hypertension     Blood pressure stable with current regimen and advised to continue         Relevant Medications    alendronate (Fosamax) 70 mg tablet    hydroCHLOROthiazide (Microzide) 12.5 mg tablet    losartan (Cozaar) 50 mg tablet    Other Relevant Orders    Comprehensive Metabolic Panel    Hypothyroidism     Patient was overtreated we will recheck T4 TSH to ensure proper dosage.  Advised to continue meds         Relevant Medications    levothyroxine (Synthroid, Levoxyl) 125 mcg tablet    Other Relevant Orders    Thyroxine, Free    Thyroid Stimulating Hormone    Osteoarthritis of knee    Osteopenia     To get repeat bone density see wrap-up suggested calcium and vitamin D           Other Visit Diagnoses       Routine general medical examination at health care facility    -  Primary    Hyperglycemia, unspecified        Relevant Orders    POCT glycosylated hemoglobin (Hb A1C) manually resulted    Diabetes mellitus screening        Relevant Orders    POCT glycosylated hemoglobin (Hb A1C) manually resulted    Asymptomatic menopausal state        Relevant Orders    XR DEXA bone density    Encounter for screening mammogram for breast cancer        Relevant Orders    BI mammo bilateral screening tomosynthesis

## 2024-05-20 NOTE — PATIENT INSTRUCTIONS
You can continue on the additional vitamin D supplement of 5000 international units until the end of May of this calendar year .  Starting June.. Please continue with the vitamin D with your multivitamin and calcium supplement, this will be from June to October of ths calendar year    So you can take the 5000 international units of vitamin D from November to March of every year.  The other months you will get vitamin D is you have before with a multivitamin and calcium supplement.  Please take your vitamin D with a handful of water unsalted almonds while with the meal.  Vitamin D is a fat-soluble vitamin that requires fat in the food to be well absorbed.  We recommend healthy fats such as with nuts, seeds, avocados, olives or with a healthy meal.  Also you may notice a multivitamin has some vitamin D.  Spending up to 30 minutes in the sun during the summer in late spring can provide natural vitamin D to the uncovered skin (arm, legs).    Calcium citrate 600 mg once daily and drink 1 cup of plant-based milk twice daily.  The plan based milk can be almond milk, soy milk, etc.    continue  diet with healthy calcium much foods such as:  Healthy foods that are rich in calcium include: Nuts, seeds, legumes/beans, peas, dark green leafy vegetables, plant-based milk  Additional calcium rich foods listed below  -Soaking almonds  overnight in the fridge with drinking water, can help with softening the almonds and improved absorption of the almonds.  If your lab work shows insufficient calcium or you are unable to consume the foods rich in calcium  ...... some supplement is recommended, such as calcium citrate.    Please continue following with your dentist every 6 months  If you are on an osteoporosis medication, please inform a dentist that invasive dental work with these medications can increase once risk for osteonecrosis of the jaw.  Please continue with good oral hygiene and dental care.    Review of osteoporosis  medications  Medications to prevent bone loss and osteoporosis fractures:  -Oral biphosphonate's (such as Actonel, Boniva, Fosamax/alendronate (these are taken either once a week or once a month depending on med dose chosen, first thing in the morning with special instructions to follow.    The duration should be limited to 5 years, to prevent increased risk for atypical fracture of femur.     Please consider exercise program involving walking or some other form of aerobic activity 5 days weekly for 30 minutes... Let's also consider strengthening of large muscle groups like the abdominal muscles or shoulder muscles... Twice weekly with reps of 5/10/15 exercises and gradually increase strength.. This is not heavy strength training but light weight training... Sit ups or back exercise routine.. Please ask for handout if uncertain how to do..This  will help to strengthen your muscles which in turn will help you to lose weight.... You might ask what is the best diet available.. I would strongly encourage you to consider  Weight Watchers.. And as  your  fellow on  Weight Watchers physician attempting to  live this  LIFE  style  choice with you....  I will be glad to give you recommendations on what to eat.. Consider buying Nydia bread.., jessica bagle thin bread.. oikos yogurt... eggs  to eat as hard-boiled... Halo top ice cream for snack... All these are delicious foods which.. when eaten and  being compliant eating three  meals daily  breakfast lunch and dinner, drinking  64 ounces of water daily we will all win together !!!!!!!. This will be a means for you to lose weight... Consider also the smart phone bharti ... My plate.. Or My  fitness  pal..,  as additional possibilities for weight loss... Rayo Camilo!    Discussed medication side effects.  The  risk benefits and treatment options  discussed with patient.       Please schedule follow-up appointment based upon your improvement/failure to  improve/chronic medical conditions and physician recommendations during office appointment at the .       Patient advised to go to er if symptoms worsen or to call answering service, or to return to office for additional evaluation    This note was partially  generated using Dragon voice recognition and there may be incorrect words, wording, spelling, or pronunciation errors that were not corrected prior to committing the note to the medical record.

## 2024-06-17 ENCOUNTER — OFFICE VISIT (OUTPATIENT)
Dept: PRIMARY CARE | Facility: CLINIC | Age: 79
End: 2024-06-17
Payer: COMMERCIAL

## 2024-06-17 VITALS
SYSTOLIC BLOOD PRESSURE: 145 MMHG | DIASTOLIC BLOOD PRESSURE: 82 MMHG | WEIGHT: 154.4 LBS | HEIGHT: 62 IN | HEART RATE: 95 BPM | OXYGEN SATURATION: 97 % | RESPIRATION RATE: 18 BRPM | TEMPERATURE: 97 F | BODY MASS INDEX: 28.41 KG/M2

## 2024-06-17 DIAGNOSIS — D17.22 LIPOMA OF LEFT UPPER EXTREMITY: Primary | ICD-10-CM

## 2024-06-17 PROCEDURE — 1159F MED LIST DOCD IN RCRD: CPT | Performed by: FAMILY MEDICINE

## 2024-06-17 PROCEDURE — 1036F TOBACCO NON-USER: CPT | Performed by: FAMILY MEDICINE

## 2024-06-17 PROCEDURE — 3077F SYST BP >= 140 MM HG: CPT | Performed by: FAMILY MEDICINE

## 2024-06-17 PROCEDURE — 1123F ACP DISCUSS/DSCN MKR DOCD: CPT | Performed by: FAMILY MEDICINE

## 2024-06-17 PROCEDURE — 99213 OFFICE O/P EST LOW 20 MIN: CPT | Performed by: FAMILY MEDICINE

## 2024-06-17 PROCEDURE — 3078F DIAST BP <80 MM HG: CPT | Performed by: FAMILY MEDICINE

## 2024-06-17 ASSESSMENT — ENCOUNTER SYMPTOMS
JOINT SWELLING: 0
COLOR CHANGE: 1
ARTHRALGIAS: 0

## 2024-06-17 NOTE — PATIENT INSTRUCTIONS
Lipoma of left upper extremity  Medium          Details  Chronic:   Code: D17.22Noted: 6/17/2024Share w/ Pt: []   Create Overview    Current Assessment & Plan Note  Written:Ernst KHOURY Leslee, DO6/17/2024 6:10 PM        Ultrasound  Reassurance   Rechk   4 weeks            Please consider exercise program involving walking or some other form of aerobic activity 5 days weekly for 30 minutes... Let's also consider strengthening of large muscle groups like the abdominal muscles or shoulder muscles... Twice weekly with reps of 5/10/15 exercises and gradually increase strength.. This is not heavy strength training but light weight training... Sit ups or back exercise routine.. Please ask for handout if uncertain how to do..This  will help to strengthen your muscles which in turn will help you to lose weight.... You might ask what is the best diet available.. I would strongly encourage you to consider  Weight Watchers.. And as  your  fellow on  Weight Watchers physician attempting to  live this  LIFE  style  choice with you....  I will be glad to give you recommendations on what to eat.. Consider buying Nydia bread.., jessica bagle thin bread.. oikos yogurt... eggs  to eat as hard-boiled... Halo top ice cream for snack... All these are delicious foods which.. when eaten and  being compliant eating three  meals daily  breakfast lunch and dinner, drinking  64 ounces of water daily we will all win together !!!!!!!. This will be a means for you to lose weight... Consider also the smart phone bharti ... My plate.. Or My  fitness  pal..,  as additional possibilities for weight loss... Good  luck  Dr. YOBANI Camilo!    Discussed medication side effects.  The  risk benefits and treatment options  discussed with patient.       Please schedule follow-up appointment based upon your improvement/failure to improve/chronic medical conditions and physician recommendations during offiappointment at the .       Patient advised to go to  er if symptoms worsen or to call answering service, or to return to office for additional evaluation    This note was partially  generated using Dragon voice recognition and there may be incorrect words, wording, spelling, or pronunciation errors that were not corrected prior to committing the note to the medical record.

## 2024-06-17 NOTE — PROGRESS NOTES
Subjective   Patient ID: Maya Haines is a 79 y.o. female who presents for bump on arm.  HPI  Lump  Left   antecubital fossa  Not  painful  Not enlarging  Had   bruise then  appeared   Review of Systems   Musculoskeletal:  Negative for arthralgias and joint swelling.   Skin:  Positive for color change.       Objective   Physical Exam  general: alert oriented x three  HEENT hearing normal to voice  Neck supple  Lungs respirations non-labored.  Cardiovascular: no peripheral edema  Skin: warm and dry without rash  Psych: judgement and insight normal  Musculoskeletal:  ambulation normal,    Left  antecubutal  fosa   eith     3.0 cm  x 4.5  cm   lesion  soft   ..   lymph:negative cervical  LYMPADENOPATHY  thyroid: non palpable enlargement   Labs        Assessment/Plan   Problem List Items Addressed This Visit       Lipoma of left upper extremity - Primary     Ultrasound  Reassurance   Rechk   4 weeks         Relevant Orders    Vascular US pseudoaneurysm injection arm left

## 2024-06-18 DIAGNOSIS — D17.22 LIPOMA OF LEFT UPPER EXTREMITY: Primary | ICD-10-CM

## 2024-06-20 ENCOUNTER — TELEPHONE (OUTPATIENT)
Dept: PRIMARY CARE | Facility: CLINIC | Age: 79
End: 2024-06-20
Payer: COMMERCIAL

## 2024-06-20 NOTE — TELEPHONE ENCOUNTER
Loretta with Radiology, is questioning the orders put in for a Vascular U/S suto aneurysm inject into left arm/ as well as Biopsy of subcutaneous lipoma tissue.   Was this supposed to be non vascular?   Please Advise

## 2024-06-21 DIAGNOSIS — D17.20 LIPOMA OF UPPER EXTREMITY, UNSPECIFIED LATERALITY: Primary | ICD-10-CM

## 2024-06-28 ENCOUNTER — HOSPITAL ENCOUNTER (OUTPATIENT)
Dept: RADIOLOGY | Facility: HOSPITAL | Age: 79
Discharge: HOME | End: 2024-06-28
Payer: COMMERCIAL

## 2024-06-28 DIAGNOSIS — D17.20 LIPOMA OF UPPER EXTREMITY, UNSPECIFIED LATERALITY: ICD-10-CM

## 2024-06-28 PROCEDURE — 76882 US LMTD JT/FCL EVL NVASC XTR: CPT | Mod: LT

## 2024-07-01 ENCOUNTER — APPOINTMENT (OUTPATIENT)
Dept: CARDIOLOGY | Facility: HOSPITAL | Age: 79
End: 2024-07-01
Payer: COMMERCIAL

## 2024-07-25 ENCOUNTER — HOSPITAL ENCOUNTER (OUTPATIENT)
Dept: RADIOLOGY | Facility: HOSPITAL | Age: 79
Discharge: HOME | End: 2024-07-25
Payer: COMMERCIAL

## 2024-07-25 DIAGNOSIS — Z12.31 ENCOUNTER FOR SCREENING MAMMOGRAM FOR BREAST CANCER: ICD-10-CM

## 2024-07-25 DIAGNOSIS — Z78.0 ASYMPTOMATIC MENOPAUSAL STATE: ICD-10-CM

## 2024-07-25 PROCEDURE — 77063 BREAST TOMOSYNTHESIS BI: CPT | Performed by: RADIOLOGY

## 2024-07-25 PROCEDURE — 77080 DXA BONE DENSITY AXIAL: CPT

## 2024-07-25 PROCEDURE — 77067 SCR MAMMO BI INCL CAD: CPT | Performed by: RADIOLOGY

## 2024-07-25 PROCEDURE — 77067 SCR MAMMO BI INCL CAD: CPT

## 2024-07-25 ASSESSMENT — LIFESTYLE VARIABLES
CURRENT_SMOKER: N
3_OR_MORE_DRINKS_PER_DAY: N

## 2024-07-25 NOTE — RESULT ENCOUNTER NOTE
PLEASE CALL Maya Haines AND LET HER KNOW THE MAMMOGRAM IS NORMAL.  PLEASE PLAN ON REPEATING IN ONE YEAR.

## 2024-10-28 DIAGNOSIS — I35.2 AORTIC VALVE STENOSIS WITH INSUFFICIENCY, ETIOLOGY OF CARDIAC VALVE DISEASE UNSPECIFIED: ICD-10-CM

## 2024-10-28 DIAGNOSIS — I65.23 BILATERAL CAROTID ARTERY STENOSIS: Primary | ICD-10-CM

## 2024-10-28 RX ORDER — CLOPIDOGREL BISULFATE 75 MG/1
75 TABLET ORAL DAILY
Qty: 90 TABLET | Refills: 2 | Status: SHIPPED | OUTPATIENT
Start: 2024-10-28

## 2024-11-11 ENCOUNTER — APPOINTMENT (OUTPATIENT)
Dept: PRIMARY CARE | Facility: CLINIC | Age: 79
End: 2024-11-11
Payer: COMMERCIAL

## 2024-11-11 VITALS
OXYGEN SATURATION: 96 % | RESPIRATION RATE: 18 BRPM | HEART RATE: 83 BPM | SYSTOLIC BLOOD PRESSURE: 128 MMHG | HEIGHT: 62 IN | DIASTOLIC BLOOD PRESSURE: 71 MMHG | BODY MASS INDEX: 28.16 KG/M2 | TEMPERATURE: 97 F | WEIGHT: 153 LBS

## 2024-11-11 DIAGNOSIS — I65.23 BILATERAL CAROTID ARTERY STENOSIS: ICD-10-CM

## 2024-11-11 DIAGNOSIS — E03.8 OTHER SPECIFIED HYPOTHYROIDISM: ICD-10-CM

## 2024-11-11 DIAGNOSIS — E78.49 OTHER HYPERLIPIDEMIA: ICD-10-CM

## 2024-11-11 DIAGNOSIS — M21.922 ACQUIRED DEFORMITY OF LEFT ELBOW: Primary | ICD-10-CM

## 2024-11-11 DIAGNOSIS — K21.00 GASTROESOPHAGEAL REFLUX DISEASE WITH ESOPHAGITIS, UNSPECIFIED WHETHER HEMORRHAGE: ICD-10-CM

## 2024-11-11 DIAGNOSIS — M17.0 PRIMARY OSTEOARTHRITIS OF BOTH KNEES: ICD-10-CM

## 2024-11-11 DIAGNOSIS — I35.2 AORTIC VALVE STENOSIS WITH INSUFFICIENCY, ETIOLOGY OF CARDIAC VALVE DISEASE UNSPECIFIED: ICD-10-CM

## 2024-11-11 DIAGNOSIS — I10 PRIMARY HYPERTENSION: ICD-10-CM

## 2024-11-11 PROBLEM — M21.929: Status: ACTIVE | Noted: 2024-11-11

## 2024-11-11 PROCEDURE — 1036F TOBACCO NON-USER: CPT | Performed by: FAMILY MEDICINE

## 2024-11-11 PROCEDURE — 3078F DIAST BP <80 MM HG: CPT | Performed by: FAMILY MEDICINE

## 2024-11-11 PROCEDURE — 1159F MED LIST DOCD IN RCRD: CPT | Performed by: FAMILY MEDICINE

## 2024-11-11 PROCEDURE — 1123F ACP DISCUSS/DSCN MKR DOCD: CPT | Performed by: FAMILY MEDICINE

## 2024-11-11 PROCEDURE — G2211 COMPLEX E/M VISIT ADD ON: HCPCS | Performed by: FAMILY MEDICINE

## 2024-11-11 PROCEDURE — 99214 OFFICE O/P EST MOD 30 MIN: CPT | Performed by: FAMILY MEDICINE

## 2024-11-11 PROCEDURE — 3074F SYST BP LT 130 MM HG: CPT | Performed by: FAMILY MEDICINE

## 2024-11-11 RX ORDER — LEVOTHYROXINE SODIUM 125 UG/1
125 TABLET ORAL DAILY
Qty: 90 TABLET | Refills: 3 | Status: SHIPPED | OUTPATIENT
Start: 2024-11-11

## 2024-11-11 RX ORDER — HYDROCHLOROTHIAZIDE 12.5 MG/1
12.5 TABLET ORAL DAILY
Qty: 90 TABLET | Refills: 3 | Status: SHIPPED | OUTPATIENT
Start: 2024-11-11

## 2024-11-11 RX ORDER — CLOPIDOGREL BISULFATE 75 MG/1
75 TABLET ORAL DAILY
Qty: 90 TABLET | Refills: 3 | Status: SHIPPED | OUTPATIENT
Start: 2024-11-11

## 2024-11-11 RX ORDER — ALENDRONATE SODIUM 70 MG/1
70 TABLET ORAL
Qty: 12 TABLET | Refills: 3 | Status: SHIPPED | OUTPATIENT
Start: 2024-11-11

## 2024-11-11 RX ORDER — LOSARTAN POTASSIUM 50 MG/1
50 TABLET ORAL DAILY
Qty: 90 TABLET | Refills: 3 | Status: SHIPPED | OUTPATIENT
Start: 2024-11-11

## 2024-11-11 RX ORDER — AMLODIPINE BESYLATE 5 MG/1
5 TABLET ORAL DAILY
Qty: 90 TABLET | Refills: 3 | Status: SHIPPED | OUTPATIENT
Start: 2024-11-11 | End: 2025-11-11

## 2024-11-11 RX ORDER — ROSUVASTATIN CALCIUM 40 MG/1
40 TABLET, COATED ORAL DAILY
Qty: 90 TABLET | Refills: 3 | Status: SHIPPED | OUTPATIENT
Start: 2024-11-11

## 2024-11-11 RX ORDER — PANTOPRAZOLE SODIUM 40 MG/1
40 TABLET, DELAYED RELEASE ORAL
Qty: 90 TABLET | Refills: 3 | Status: SHIPPED | OUTPATIENT
Start: 2024-11-11 | End: 2025-11-06

## 2024-11-11 NOTE — ASSESSMENT & PLAN NOTE
The reflux esophagitis he has been occurring in a recurrent pattern for years. The course has been without change. The reflux is described as...mild. The patient remains compliant on meds.. The patient takes medications in a intermittent fashion. Denies dysphagia hoarseness or odynophagia...  stable and continue meds    
HERE FOR RECHK OF THYROIDLast clinic visit was month (S) ago. Symptoms do not include weight gain, cold intolerance, fatigue, weakness, appetite, hair loss, dry skin    There is no known event that preceded symptom onset.  . Current treatment includes levothyroxine. Report there is good compliance with medical treatment.      stable and continue meds   
Patient is also here for follow-up of hyperlipidemia. The most recent measurements for this patient would take it on..   2023   .... Associated symptoms do not include chest pain, Cramps with exertion, myalgias or nausea. Current treatment includes statins. By report there is good compliance with treatment. Pertinent medical history includes   hypertension   check  labs to ensure stability   
Patient is also here for follow-up of hypertension.. Symptoms do not include headache confusion visual disturbance dizziness shortness of breath chest pain syncope or palpitations. Recent blood pressure patient has  been checking...130/80 By report there is good compliance with treatment. Pertinent medical history includes hypothyroid.    stable and continue meds      
To ortho for opinion   
<--- Click to Launch ICDx for PreOp, PostOp and Procedure

## 2024-11-11 NOTE — PATIENT INSTRUCTIONS

## 2024-11-11 NOTE — PROGRESS NOTES
Subjective   Patient ID: Maya Haines is a 79 y.o. female who presents for Follow-up.  HPI  Patient is also here for follow-up of hypertension.. Symptoms do not include headache confusion visual disturbance dizziness shortness of breath chest pain syncope or palpitations. Recent blood pressure patient has  been checking...130/80 By report there is good compliance with treatment. Pertinent medical history includes hypothyroid.  HERE FOR RECHK OF THYROIDLast clinic visit was month (S) ago. Symptoms do not include weight gain, cold intolerance, fatigue, weakness, appetite, hair loss, dry skin    There is no known event that preceded symptom onset.  . Current treatment includes levothyroxine. Report there is good compliance with medical treatment.    The reflux esophagitis he has been occurring in a recurrent pattern for years. The course has been without change. The reflux is described as...mild. The patient remains compliant on meds.. The patient takes medications in a intermittent fashion. Denies dysphagia hoarseness or odynophagia...   Insert SmartText      Patient is also here for follow-up of hyperlipidemia. The most recent measurements for this patient would take it on..   2023   .... Associated symptoms do not include chest pain, Cramps with exertion, myalgias or nausea. Current treatment includes statins. By report there is good compliance with treatment. Pertinent medical history includes  hypertension   Review of Systems  All other  pertinent  systems reviewed and are negative except  those  mentioned  in HPI   Objective   Physical Exam  Vitals: I have reviewed the vitals  General: Well-developed.  In no acute distress.  Eyes:   sclera nonicteric.  Conjunctiva not injected.  No discharge.   HEAD: Normocephalic, atraumatic.  HEENT   Mucous membranes moist.  Posterior oropharynx nonerythematous, no tonsillar exudates.      No cervical lymphadenopathy.  Cardio: Regular rate and rhythm.  No murmur, rub or  gallop.  Pulmonary: Lungs clear to auscultation in all fields.  No accessory muscle use.  GI/: Normal active bowel sounds.  Soft, nontender.  No masses or organomegaly appreciated.  Musculoskeletal: No gross deformities appreciated.  Left elbow with lipomatous mass proximal forearm  Neuro: Alert, age-appropriate.  Normal muscle tone.  Moving all extremities.  Skin: No rash, bruises or lesions.   Labs        Assessment/Plan   Problem List Items Addressed This Visit       Aortic insufficiency with aortic stenosis    Relevant Medications    amLODIPine (Norvasc) 5 mg tablet    clopidogrel (Plavix) 75 mg tablet    GERD (gastroesophageal reflux disease)      The reflux esophagitis he has been occurring in a recurrent pattern for years. The course has been without change. The reflux is described as...mild. The patient remains compliant on meds.. The patient takes medications in a intermittent fashion. Denies dysphagia hoarseness or odynophagia...  stable and continue meds           Relevant Medications    pantoprazole (ProtoNix) 40 mg EC tablet    Other Relevant Orders    CBC and Auto Differential    Hyperlipidemia     Patient is also here for follow-up of hyperlipidemia. The most recent measurements for this patient would take it on..   2023   .... Associated symptoms do not include chest pain, Cramps with exertion, myalgias or nausea. Current treatment includes statins. By report there is good compliance with treatment. Pertinent medical history includes   hypertension   check  labs to ensure stability          Relevant Medications    amLODIPine (Norvasc) 5 mg tablet    rosuvastatin (Crestor) 40 mg tablet    Other Relevant Orders    Lipid Panel    Primary hypertension     Patient is also here for follow-up of hypertension.. Symptoms do not include headache confusion visual disturbance dizziness shortness of breath chest pain syncope or palpitations. Recent blood pressure patient has  been checking...130/80 By report  there is good compliance with treatment. Pertinent medical history includes hypothyroid.    stable and continue meds             Relevant Medications    alendronate (Fosamax) 70 mg tablet    hydroCHLOROthiazide (Microzide) 12.5 mg tablet    losartan (Cozaar) 50 mg tablet    Other Relevant Orders    Comprehensive Metabolic Panel    Hypothyroidism     HERE FOR RECHK OF THYROIDLast clinic visit was month (S) ago. Symptoms do not include weight gain, cold intolerance, fatigue, weakness, appetite, hair loss, dry skin    There is no known event that preceded symptom onset.  . Current treatment includes levothyroxine. Report there is good compliance with medical treatment.      stable and continue meds          Relevant Medications    levothyroxine (Synthroid, Levoxyl) 125 mcg tablet    Other Relevant Orders    Thyroxine, Free    Thyroid Stimulating Hormone    Osteoarthritis of knee    Bilateral carotid artery stenosis    Relevant Medications    clopidogrel (Plavix) 75 mg tablet    Elbow deformity - Primary     To ortho for opinion

## 2024-11-14 ENCOUNTER — LAB (OUTPATIENT)
Dept: LAB | Facility: LAB | Age: 79
End: 2024-11-14
Payer: COMMERCIAL

## 2024-11-14 ENCOUNTER — ANCILLARY PROCEDURE (OUTPATIENT)
Dept: CARDIOLOGY | Facility: HOSPITAL | Age: 79
End: 2024-11-14
Payer: COMMERCIAL

## 2024-11-14 DIAGNOSIS — K21.00 GASTROESOPHAGEAL REFLUX DISEASE WITH ESOPHAGITIS, UNSPECIFIED WHETHER HEMORRHAGE: ICD-10-CM

## 2024-11-14 DIAGNOSIS — E03.8 OTHER SPECIFIED HYPOTHYROIDISM: ICD-10-CM

## 2024-11-14 DIAGNOSIS — I35.2 NONRHEUMATIC AORTIC INSUFFICIENCY WITH AORTIC STENOSIS: ICD-10-CM

## 2024-11-14 DIAGNOSIS — E78.2 MIXED HYPERLIPIDEMIA: ICD-10-CM

## 2024-11-14 DIAGNOSIS — E78.49 OTHER HYPERLIPIDEMIA: ICD-10-CM

## 2024-11-14 DIAGNOSIS — I10 PRIMARY HYPERTENSION: ICD-10-CM

## 2024-11-14 LAB
ALBUMIN SERPL BCP-MCNC: 4.5 G/DL (ref 3.4–5)
ALP SERPL-CCNC: 47 U/L (ref 33–136)
ALT SERPL W P-5'-P-CCNC: 9 U/L (ref 7–45)
ANION GAP SERPL CALC-SCNC: 12 MMOL/L (ref 10–20)
AST SERPL W P-5'-P-CCNC: 16 U/L (ref 9–39)
BASOPHILS # BLD AUTO: 0.03 X10*3/UL (ref 0–0.1)
BASOPHILS NFR BLD AUTO: 0.5 %
BILIRUB SERPL-MCNC: 0.5 MG/DL (ref 0–1.2)
BUN SERPL-MCNC: 13 MG/DL (ref 6–23)
CALCIUM SERPL-MCNC: 9.8 MG/DL (ref 8.6–10.3)
CHLORIDE SERPL-SCNC: 108 MMOL/L (ref 98–107)
CHOLEST SERPL-MCNC: 141 MG/DL (ref 0–199)
CHOLESTEROL/HDL RATIO: 2.7
CO2 SERPL-SCNC: 27 MMOL/L (ref 21–32)
CREAT SERPL-MCNC: 0.76 MG/DL (ref 0.5–1.05)
EGFRCR SERPLBLD CKD-EPI 2021: 80 ML/MIN/1.73M*2
EOSINOPHIL # BLD AUTO: 0.21 X10*3/UL (ref 0–0.4)
EOSINOPHIL NFR BLD AUTO: 3.4 %
ERYTHROCYTE [DISTWIDTH] IN BLOOD BY AUTOMATED COUNT: 17.7 % (ref 11.5–14.5)
GLUCOSE SERPL-MCNC: 104 MG/DL (ref 74–99)
HCT VFR BLD AUTO: 26.7 % (ref 36–46)
HDLC SERPL-MCNC: 52.3 MG/DL
HGB BLD-MCNC: 7.9 G/DL (ref 12–16)
IMM GRANULOCYTES # BLD AUTO: 0.02 X10*3/UL (ref 0–0.5)
IMM GRANULOCYTES NFR BLD AUTO: 0.3 % (ref 0–0.9)
LDLC SERPL CALC-MCNC: 72 MG/DL
LYMPHOCYTES # BLD AUTO: 1.09 X10*3/UL (ref 0.8–3)
LYMPHOCYTES NFR BLD AUTO: 17.8 %
MCH RBC QN AUTO: 24.2 PG (ref 26–34)
MCHC RBC AUTO-ENTMCNC: 29.6 G/DL (ref 32–36)
MCV RBC AUTO: 82 FL (ref 80–100)
MONOCYTES # BLD AUTO: 0.76 X10*3/UL (ref 0.05–0.8)
MONOCYTES NFR BLD AUTO: 12.4 %
NEUTROPHILS # BLD AUTO: 4.03 X10*3/UL (ref 1.6–5.5)
NEUTROPHILS NFR BLD AUTO: 65.6 %
NON HDL CHOLESTEROL: 89 MG/DL (ref 0–149)
NRBC BLD-RTO: 0 /100 WBCS (ref 0–0)
PLATELET # BLD AUTO: 303 X10*3/UL (ref 150–450)
POTASSIUM SERPL-SCNC: 4.7 MMOL/L (ref 3.5–5.3)
PROT SERPL-MCNC: 6.8 G/DL (ref 6.4–8.2)
RBC # BLD AUTO: 3.27 X10*6/UL (ref 4–5.2)
SODIUM SERPL-SCNC: 142 MMOL/L (ref 136–145)
T4 FREE SERPL-MCNC: 1.03 NG/DL (ref 0.61–1.12)
TRIGL SERPL-MCNC: 82 MG/DL (ref 0–149)
TSH SERPL-ACNC: 5.74 MIU/L (ref 0.44–3.98)
VLDL: 16 MG/DL (ref 0–40)
WBC # BLD AUTO: 6.1 X10*3/UL (ref 4.4–11.3)

## 2024-11-14 PROCEDURE — 93306 TTE W/DOPPLER COMPLETE: CPT | Performed by: INTERNAL MEDICINE

## 2024-11-14 PROCEDURE — 82728 ASSAY OF FERRITIN: CPT

## 2024-11-14 PROCEDURE — 80061 LIPID PANEL: CPT

## 2024-11-14 PROCEDURE — 93306 TTE W/DOPPLER COMPLETE: CPT

## 2024-11-14 PROCEDURE — 84443 ASSAY THYROID STIM HORMONE: CPT

## 2024-11-14 PROCEDURE — 36415 COLL VENOUS BLD VENIPUNCTURE: CPT

## 2024-11-14 PROCEDURE — 80053 COMPREHEN METABOLIC PANEL: CPT

## 2024-11-14 PROCEDURE — 83550 IRON BINDING TEST: CPT

## 2024-11-14 PROCEDURE — 83540 ASSAY OF IRON: CPT

## 2024-11-14 PROCEDURE — 84439 ASSAY OF FREE THYROXINE: CPT

## 2024-11-14 PROCEDURE — 85025 COMPLETE CBC W/AUTO DIFF WBC: CPT

## 2024-11-16 LAB
AORTIC VALVE MEAN GRADIENT: 11 MMHG
AORTIC VALVE PEAK VELOCITY: 2.41 M/S
AV PEAK GRADIENT: 23 MMHG
AVA (PEAK VEL): 1.6 CM2
AVA (VTI): 1.5 CM2
EJECTION FRACTION APICAL 4 CHAMBER: 62.7
EJECTION FRACTION: 68 %
LEFT VENTRICLE INTERNAL DIMENSION DIASTOLE: 3.43 CM (ref 3.5–6)
LEFT VENTRICULAR OUTFLOW TRACT DIAMETER: 1.9 CM
LV EJECTION FRACTION BIPLANE: 62 %
MITRAL VALVE E/A RATIO: 0.77
RIGHT VENTRICLE PEAK SYSTOLIC PRESSURE: 46.5 MMHG

## 2024-11-19 ENCOUNTER — OFFICE VISIT (OUTPATIENT)
Dept: PRIMARY CARE | Facility: CLINIC | Age: 79
End: 2024-11-19
Payer: COMMERCIAL

## 2024-11-19 VITALS
OXYGEN SATURATION: 99 % | DIASTOLIC BLOOD PRESSURE: 62 MMHG | HEIGHT: 62 IN | HEART RATE: 82 BPM | BODY MASS INDEX: 27.79 KG/M2 | WEIGHT: 151 LBS | TEMPERATURE: 97 F | SYSTOLIC BLOOD PRESSURE: 159 MMHG | RESPIRATION RATE: 18 BRPM

## 2024-11-19 DIAGNOSIS — D64.9 ANEMIA, UNSPECIFIED TYPE: ICD-10-CM

## 2024-11-19 DIAGNOSIS — D64.9 ANEMIA, UNSPECIFIED TYPE: Primary | ICD-10-CM

## 2024-11-19 LAB
FERRITIN SERPL-MCNC: 19 NG/ML (ref 8–150)
IRON SATN MFR SERPL: 4 % (ref 25–45)
IRON SERPL-MCNC: 19 UG/DL (ref 35–150)
TIBC SERPL-MCNC: 436 UG/DL (ref 240–445)
UIBC SERPL-MCNC: 417 UG/DL (ref 110–370)

## 2024-11-19 PROCEDURE — 1036F TOBACCO NON-USER: CPT | Performed by: FAMILY MEDICINE

## 2024-11-19 PROCEDURE — 1159F MED LIST DOCD IN RCRD: CPT | Performed by: FAMILY MEDICINE

## 2024-11-19 PROCEDURE — 1123F ACP DISCUSS/DSCN MKR DOCD: CPT | Performed by: FAMILY MEDICINE

## 2024-11-19 PROCEDURE — 3078F DIAST BP <80 MM HG: CPT | Performed by: FAMILY MEDICINE

## 2024-11-19 PROCEDURE — 3077F SYST BP >= 140 MM HG: CPT | Performed by: FAMILY MEDICINE

## 2024-11-19 PROCEDURE — G2211 COMPLEX E/M VISIT ADD ON: HCPCS | Performed by: FAMILY MEDICINE

## 2024-11-19 PROCEDURE — 99213 OFFICE O/P EST LOW 20 MIN: CPT | Performed by: FAMILY MEDICINE

## 2024-11-19 RX ORDER — CHOLECALCIFEROL (VITAMIN D3) 25 MCG
2500 TABLET,CHEWABLE ORAL DAILY
Qty: 90 TABLET | Refills: 2 | Status: SHIPPED | OUTPATIENT
Start: 2024-11-19

## 2024-11-19 RX ORDER — FERROUS SULFATE 325(65) MG
TABLET ORAL
Qty: 30 TABLET | Refills: 3 | Status: SHIPPED | OUTPATIENT
Start: 2024-11-19 | End: 2024-11-20

## 2024-11-19 ASSESSMENT — ENCOUNTER SYMPTOMS
CONFUSION: 0
BRUISES/BLEEDS EASILY: 1
WEIGHT LOSS: 0
ANOREXIA: 0
HEMATEMESIS: 0
HEMATOCHEZIA: 0
LIGHT-HEADEDNESS: 0
ABDOMINAL PAIN: 0
LEG SWELLING: 0
PALPITATIONS: 0
FEVER: 0

## 2024-11-19 NOTE — PROGRESS NOTES
Subjective   Patient ID: Maya Haines is a 79 y.o. female who presents for lab results.  Anemia  Presents for follow-up visit. Symptoms include bruises/bleeds easily and malaise/fatigue. There has been no abdominal pain, anorexia, confusion, fever, leg swelling, light-headedness, pallor, palpitations, pica or weight loss. Signs of blood loss that are not present include hematemesis, hematochezia, melena, menorrhagia and vaginal bleeding.       Review of Systems   Constitutional:  Positive for malaise/fatigue. Negative for fever and weight loss.   Cardiovascular:  Negative for palpitations.   Gastrointestinal:  Negative for abdominal pain, anorexia, hematemesis, hematochezia and melena.   Genitourinary:  Negative for menorrhagia and vaginal bleeding.   Skin:  Negative for pallor.   Neurological:  Negative for light-headedness.   Hematological:  Bruises/bleeds easily.   Psychiatric/Behavioral:  Negative for confusion.        Objective   Physical Exam  general: alert oriented x three  HEENT hearing normal to voice  Neck supple  Lungs respirations non-labored.  Cardiovascular: no peripheral edema  Skin: warm and dry without rash  Psych: judgement and insight normal  Musculoskeletal:  ambulation normal,    Abdomen soft   .. No tendenress   lymph:negative cervical  LYMPADENOPATHY  thyroid: non palpable enlargement   Labs    Colonoscopy  Order# [id number]  Reading physician: Christopher AUSTIN MD Ordering provider: Moe Balbuena MD Study date: 01/31/2023     Result Information    Status: Edited Result - FINAL (Collected: 8/5/2022 11:15) Provider Status: Ordered     Result Text    Result Text   Patient Name: Maya Haines  Procedure Date: 8/5/2022 11:15 AM  MRN: [id number]  Account Number: [id number]  YOB: 1945  Admit Type: Outpatient  Site: [place] [address]  Ethnicity: Not  or   Race: White  Attending MD: Christopher Guzmán MD, [id number]  Procedure:             Colonoscopy  Indications:           Screening for colon  cancer: Family history of                          colorectal cancer in distant relative(s)  Providers:             Christopher Guzmán MD (Doctor)  Referring:             Moe Balbuena MD  Medicines:             Propofol per Anesthesia  Complications:         No immediate complications. Estimated blood loss:                          Minimal.  Procedure:             Pre-Anesthesia Assessment:                         - Prior to the procedure, a History and Physical was                          performed, and patient medications and allergies were                          reviewed. The patient is competent. The risks and                          benefits of the procedure and the sedation options and                          risks were discussed with the patient. All questions                          were answered and informed consent was obtained.                          Patient identification and proposed procedure were                          verified by the physician and the nurse in the                          pre-procedure area in the procedure room. Mental                          Status Examination: alert and oriented. Airway                          Examination: normal oropharyngeal airway and neck                          mobility. Respiratory Examination: clear to                          auscultation. CV Examination: normal. Prophylactic                          Antibiotics: The patient does not require prophylactic                          antibiotics. Prior Anticoagulants: The patient has                          taken no anticoagulant or antiplatelet agents. ASA                          Grade Assessment: II - A patient with mild systemic                          disease. After reviewing the risks and benefits, the                          patient was deemed in satisfactory condition to                          undergo the procedure. The anesthesia plan was to use                          monitored  anesthesia care (MAC). Immediately prior to                          administration of medications, the patient was                          re-assessed for adequacy to receive sedatives. The                          heart rate, respiratory rate, oxygen saturations,                          blood pressure, adequacy of pulmonary ventilation, and                          response to care were monitored throughout the                          procedure. The physical status of the patient was                          re-assessed after the procedure.                         After I obtained informed consent, the scope was                          passed under direct vision. Throughout the procedure,                          the patient's blood pressure, pulse, and oxygen                          saturations were monitored continuously. The adult                          colonoscope was introduced through the anus and                          advanced to the cecum, identified by appendiceal                          orifice and ileocecal valve. The colonoscopy was                          technically difficult and complex due to a tortuous                          colon. Successful completion of the procedure was                          aided by changing the patient to a supine position and                          applying abdominal pressure. The patient tolerated the                          procedure well. The quality of the bowel preparation                          was good. The ileocecal valve, appendiceal orifice,                          and rectum were photographed.  Findings:       The perianal and digital rectal examinations were normal.       A 3 mm polyp was found in the ascending colon. The polyp was sessile.        The polyp was removed with a jumbo cold forceps. Resection and retrieval        were complete. Verification of patient identification for the specimen        was done. Estimated blood loss was  minimal.  Estimated Blood Loss:       Estimated blood loss was minimal.  Impression:            - One 3 mm polyp in the ascending colon, removed with                          a jumbo cold forceps. Resected and retrieved.  Recommendation:        - Repeat colonoscopy date to be determined after                          pending pathology results are reviewed for                          surveillance based on pathology results.                         - Return to my office in 2 weeks.  Procedure Code(s):     --- Professional ---                         09032, Colonoscopy, flexible; with biopsy, single or                          multiple  Diagnosis Code(s):     --- Professional ---                         D12.2, Benign neoplasm of ascending colon                         Z80.0, Family history of malignant neoplasm of                          digestive organs                         Z12.11, Encounter for screening for malignant neoplasm                          of colon  CPT copyright 2021 [employer]. All rights reserved.  The codes documented in this report are preliminary and upon  review may   be revised to meet current compliance requirements.  Attending Participation:       I personally performed the entire procedure.  Christopher Guzmán MD  8/5/2022 12:14:06 PM  This report has been signed electronically.  Number of Addenda: 0  Note Initiated On: 8/5/2022 11:15 AM  Scope Withdrawal Time 0 hours 4 minutes 10 seconds   Total Procedure Duration Time 0 hours 26 minutes 58 seconds     Final result       Visible to patient: Yes (not seen)       Dx: Gastroesophageal reflux disease with ...    2 Result Notes      Component  Ref Range & Units 11 mo ago   Vitamin B12  211 - 911 pg/mL 216   Resulting Agency [facility]          Assessment/Plan   Problem List Items Addressed This Visit    None  Visit Diagnoses       Anemia, unspecified type    -  Primary    Relevant Medications    cyanocobalamin (Vitamin B-12) 2,500  mcg tablet    ferrous sulfate, 325 mg ferrous sulfate, tablet    Other Relevant Orders    Iron and TIBC    Ferritin    Vitamin B12    Folate    CBC and Auto Differential

## 2024-11-19 NOTE — PATIENT INSTRUCTIONS
Please take vitamin B12 2500 IUs daily    Please take iron twice weekly along with vitamin C    Avoid all anti-inflammatory medications    Get labs before next visit    Follow-up office 4 weeks    Call if black stool for red blood bloody stool    Please consider exercise program involving walking or some other form of aerobic activity 5 days weekly for 30 minutes... Let's also consider strengthening of large muscle groups like the abdominal muscles or shoulder muscles... Twice weekly with reps of 5/10/15 exercises and gradually increase strength.. This is not heavy strength training but light weight training... Sit ups or back exercise routine.. Please ask for handout if uncertain how to do..This  will help to strengthen your muscles which in turn will help you to lose weight.... You might ask what is the best diet available.. I would strongly encourage you to consider  Weight Watchers.. And as  your  fellow on  Weight Watchers physician attempting to  live this  LIFE  style  choice with you....  I will be glad to give you recommendations on what to eat.. Consider buying Nydia bread.., jessica bagle thin bread.. oikos yogurt... eggs  to eat as hard-boiled... Halo top ice cream for snack... All these are delicious foods which.. when eaten and  being compliant eating three  meals daily  breakfast lunch and dinner, drinking  64 ounces of water daily we will all win together !!!!!!!. This will be a means for you to lose weight... Consider also the smart phone bharti ... My plate.. Or My  fitness  pal..,  as additional possibilities for weight loss... Good  luck  Dr. YOBANI Camilo!    Discussed medication side effects.  The  risk benefits and treatment options  discussed with patient.       Please schedule follow-up appointment based upon your improvement/failure to improve/chronic medical conditions and physician recommendations during office appointment at the .       Patient advised to go to er if symptoms worsen  or to call answering service, or to return to office for additional evaluation    This note was partially  generated using Dragon voice recognition and there may be incorrect words, wording, spelling, or pronunciation errors that were not corrected prior to committing the note to the medical record.

## 2024-11-20 RX ORDER — FERROUS GLUCONATE 324(37.5)
TABLET ORAL
Qty: 30 TABLET | Refills: 3 | Status: SHIPPED | OUTPATIENT
Start: 2024-11-20

## 2024-11-21 ENCOUNTER — OFFICE VISIT (OUTPATIENT)
Dept: ORTHOPEDIC SURGERY | Facility: CLINIC | Age: 79
End: 2024-11-21
Payer: COMMERCIAL

## 2024-11-21 ENCOUNTER — APPOINTMENT (OUTPATIENT)
Dept: CARDIOLOGY | Facility: CLINIC | Age: 79
End: 2024-11-21
Payer: COMMERCIAL

## 2024-11-21 VITALS
SYSTOLIC BLOOD PRESSURE: 134 MMHG | BODY MASS INDEX: 28.16 KG/M2 | DIASTOLIC BLOOD PRESSURE: 88 MMHG | HEART RATE: 79 BPM | HEIGHT: 62 IN | WEIGHT: 153 LBS

## 2024-11-21 DIAGNOSIS — I10 PRIMARY HYPERTENSION: ICD-10-CM

## 2024-11-21 DIAGNOSIS — E78.2 MIXED HYPERLIPIDEMIA: ICD-10-CM

## 2024-11-21 DIAGNOSIS — M21.922 ACQUIRED DEFORMITY OF LEFT ELBOW: Primary | ICD-10-CM

## 2024-11-21 DIAGNOSIS — M25.522 LEFT ELBOW PAIN: ICD-10-CM

## 2024-11-21 DIAGNOSIS — I35.2 NONRHEUMATIC AORTIC INSUFFICIENCY WITH AORTIC STENOSIS: ICD-10-CM

## 2024-11-21 PROCEDURE — 1123F ACP DISCUSS/DSCN MKR DOCD: CPT | Performed by: STUDENT IN AN ORGANIZED HEALTH CARE EDUCATION/TRAINING PROGRAM

## 2024-11-21 PROCEDURE — 99214 OFFICE O/P EST MOD 30 MIN: CPT | Performed by: INTERNAL MEDICINE

## 2024-11-21 PROCEDURE — 1036F TOBACCO NON-USER: CPT | Performed by: INTERNAL MEDICINE

## 2024-11-21 PROCEDURE — 99203 OFFICE O/P NEW LOW 30 MIN: CPT | Performed by: STUDENT IN AN ORGANIZED HEALTH CARE EDUCATION/TRAINING PROGRAM

## 2024-11-21 PROCEDURE — 3079F DIAST BP 80-89 MM HG: CPT | Performed by: INTERNAL MEDICINE

## 2024-11-21 PROCEDURE — G2211 COMPLEX E/M VISIT ADD ON: HCPCS | Performed by: INTERNAL MEDICINE

## 2024-11-21 PROCEDURE — 1159F MED LIST DOCD IN RCRD: CPT | Performed by: INTERNAL MEDICINE

## 2024-11-21 PROCEDURE — 3075F SYST BP GE 130 - 139MM HG: CPT | Performed by: INTERNAL MEDICINE

## 2024-11-21 PROCEDURE — 1123F ACP DISCUSS/DSCN MKR DOCD: CPT | Performed by: INTERNAL MEDICINE

## 2024-11-21 NOTE — PROGRESS NOTES
Patient:  Maya Haines  YOB: 1945  MRN: [id number]       Impression/Plan:     Diagnoses and all orders for this visit:  Nonrheumatic aortic insufficiency with aortic stenosis  -     Mild, hi functional capacity  -     Monitor symptoms/exam  Primary hypertension  -     Well controlled  Mixed hyperlipidemia  -     Well controlled      Chief Complaint/Active Symptoms:       Maya Haines is a 79 y.o. female who presents with hypertension, hyperlipidemia carotid artery disease with left carotid stent 2023, angiographically normal coronary arteries 2003, congenital VSD with spontaneous closure as an adult, arthritis and hypothyroidism.  Carotid artery disease followed at [facility] Dr. Terry Fisher.  Mild aortic stenosis November 2024 EF by echo 70%.  Normal RV.  RVSP 46.  Mean aortic gradient 14 mmHg 1+ AI    Last in this office 5/14/2024 at which time she was doing quite well          11/14/24 thyroid normal.   Hemoglobin 7.9 CMP unremarkable except glucose 104 creatinine was 0.76.  Cholesterol 141 HDL 52 LDL 72    Seen by vascular surgery division [facility] nurse practitioner on 8/28/2024 at which time was felt to be stable.  Duplex at that time left carotid stent widely patent.  On the right less than 60%.  LUIS mild disease on the right normal on the left    11/14/2024 Echo   1. Left ventricular ejection fraction is normal, by visual estimate at 65-70%.   2. Left ventricular diastolic filling was indeterminate.   3. Patient is tachycardic during the study.   4. There is normal right ventricular global systolic function.   5. Left atrial diameter is about 4.2 cm. Left atrium is dilated with respect to the aortic root.   6. There is no mitral stenosis, there is trace to mild mitral regurgitation.   7. There is mild tricuspid regurgitation. Estimated PA systolic pressure is 46 mmHg consistent with moderate pulmonary hypertension.   8. Peak and mean gradients across the aortic valve are 27 and 14 mm  respectively. There is aortic sclerosis. There is mild aortic regurgitation. Estimated aortic valve area is about 1.5 cmï¿½ consistent with mild calcific aortic stenosis. Aortic root size is normal.   9. Inferior vena cava is suboptimally visualized.  10. Compared to the previous study from September 2022, biatrial enlargement is now noted, previously reported to be normal, RV systolic pressure was 36 mmHg then, 46 mmHg now, patient is tachycardic during the present study.    She denies angina, dyspnea, palpitation, edema, lightheadedness or syncope.  She has had no symptoms of claudication or neurologic deterioration.  There have been no hospitalizations or emergency room visits since last office visit.            Review of Systems: Unremarkable except as noted above    Meds     Current Outpatient Medications   Medication Instructions    alendronate (FOSAMAX) 70 mg, oral, Once Weekly    amLODIPine (NORVASC) 5 mg, oral, Daily    ASPIRIN ORAL 81 mg, Daily    clopidogrel (PLAVIX) 75 mg, oral, Daily    cyanocobalamin (VITAMIN B-12) 2,500 mcg, oral, Daily    ferrous gluconate 324 (38 Fe) mg tablet TAKE TWICE WEEKLY WITH VITAMIN C    hydroCHLOROthiazide (MICROZIDE) 12.5 mg, oral, Daily    levothyroxine (SYNTHROID, LEVOXYL) 125 mcg, oral, Daily    losartan (COZAAR) 50 mg, oral, Daily    magnesium oxide 500 mg, Once    naproxen (NAPROSYN) 500 mg, oral, Daily PRN    pantoprazole (PROTONIX) 40 mg, oral, Daily RT    rosuvastatin (CRESTOR) 40 mg, oral, Daily    VIT B COMP NO.3-FOLIC-C-BIOTIN ORAL 1 tablet, Daily with breakfast    ZINC ACETATE ORAL Use in the mouth or throat.        Allergies     Allergies   Allergen Reactions    Ace Inhibitors Cough    Codeine Hives    Lisinopril Cough         Annotated Problems     Specialty Problems          Cardiology Problems    Aortic insufficiency with aortic stenosis     SEPT 2022 echo LVEF 60%  1+ AI with aortic stenosis Vmax 2.16 M/sec  mean 11         Hyperlipidemia     2003 Cath  "with normal coronary arteries         Primary hypertension    Shortness of breath on exertion    Ventricular septal defect ([facility])     Spontaneous closure         Bilateral carotid artery stenosis     Feb 2024 Duplex shows widely patent Left stent, <40% REILLY  7/18/2023 left carotid stenting at [facility] Dr. Terry Fisher              Problem List     Patient Active Problem List    Diagnosis Date Noted    Elbow deformity 11/11/2024    Lipoma of left upper extremity 06/17/2024    Bilateral carotid artery stenosis 05/14/2024    Elevated glucose 06/20/2023    Arthrofibrosis of knee joint, left 04/28/2023    Closed nondisplaced transverse fracture of left patella 04/28/2023    Abnormal mammogram 01/26/2023    Aortic insufficiency with aortic stenosis 01/26/2023    Asthma 01/26/2023    Change in bowel habit 01/26/2023    Fibromyalgia 01/26/2023    Foot pain 01/26/2023    GERD (gastroesophageal reflux disease) 01/26/2023    Hyperlipidemia 01/26/2023    Primary hypertension 01/26/2023    Hyperthyroidism 01/26/2023    Hypothyroidism 01/26/2023    Lumbar spinal stenosis 01/26/2023    Osteoarthritis of knee 01/26/2023    Osteopenia 01/26/2023    Osteoporosis 01/26/2023    Shortness of breath on exertion 01/26/2023    Sinobronchitis 01/26/2023    Stomatitis 01/26/2023    Ventricular septal defect ([facility]) 01/26/2023    Vitamin D deficiency 01/26/2023    Vulvar dermatitis 01/26/2023    ACE-inhibitor cough 01/26/2023    Overweight with body mass index (BMI) of 27 to 27.9 in adult 01/26/2023    Senile cataract 06/03/2013       Objective:     Vitals:    11/21/24 1416   BP: 134/88   BP Location: Left arm   Patient Position: Sitting   Pulse: 79   Weight: 69.4 kg (153 lb)   Height: 1.575 m (5' 2\")      Wt Readings from Last 4 Encounters:   11/21/24 69.4 kg (153 lb)   11/19/24 68.5 kg (151 lb)   11/11/24 69.4 kg (153 lb)   06/17/24 70 kg (154 lb 6.4 oz)           LAB:     Lab Results   Component Value Date    WBC 6.1 11/14/2024 "    HGB 7.9 (L) 11/14/2024    HCT 26.7 (L) 11/14/2024     11/14/2024    CHOL 141 11/14/2024    TRIG 82 11/14/2024    HDL 52.3 11/14/2024    ALT 9 11/14/2024    AST 16 11/14/2024     11/14/2024    K 4.7 11/14/2024     (H) 11/14/2024    CREATININE 0.76 11/14/2024    BUN 13 11/14/2024    CO2 27 11/14/2024    TSH 5.74 (H) 11/14/2024    HGBA1C 6.1 05/20/2024       Diagnostic Studies:     XR DEXA bone density    Result Date: 7/25/2024  Interpreted By:  Morgan Joshi, STUDY: DEXA BONE DENSITY7/25/2024 9:18 am   INDICATION: Signs/Symptoms:See Associated Diagnosis. The patient is a 80 y/o year old F.   COMPARISON: None.   ACCESSION NUMBER(S): [id number]   ORDERING CLINICIAN: MALINI HERNANDEZ   TECHNIQUE: DEXA BONE DENSITY   FINDINGS: SPINE L1-L4 Bone Mineral Density: 1.134 T-Score -0.5  Z-Score 1.2 Bone Mineral Density change vs baseline:  Not reported Bone Mineral Density change vs previous: Not reported   LEFT FEMUR -TOTAL Bone Mineral Density: 0.731 T-Score -2.2   Z-Score  -0.3 Bone Mineral Density change vs baseline: Not reported Bone Mineral Density change vs previous: Not reported   LEFT FEMUR -NECK Bone Mineral Density: 0.679 T-Score -2.6  Z-Score -0.6 Bone Mineral Density change vs baseline: Not reported Bone Mineral Density change vs previous: Not reported     World Health Organization (WHO) criteria for post-menopausal,  Women: Normal:         T-score at or above -1 SD Osteopenia:   T-score between -1 and -2.5 SD Osteoporosis: T-score at or below -2.5 SD     10-year Fracture Risk: Major Osteoporotic Fracture  28.1 Hip Fracture                        9.4   Note:  If no FRAX score is reported, it is because: Some T-score for Spine Total or Hip Total or Femoral Neck at or below -2.5   This exam was performed at [facility] on a link bird Dexa Unit.       DEXA:  According to World Health Organization criteria, classification is osteoporosis.   Followup recommended in  two years or sooner as clinically warranted.   All images and detailed analysis are available on the  Radiology PACS.   MACRO: None   Signed by: Morgan Joshi 7/25/2024 9:33 AM Dictation workstation:   [id number]    BI mammo bilateral screening tomosynthesis    Result Date: 7/25/2024  Interpreted By:  Morgan Joshi, STUDY: BI MAMMO BILATERAL SCREENING TOMOSYNTHESIS;  7/25/2024 9:04 am   ACCESSION NUMBER(S): [id number]   ORDERING CLINICIAN: MALINI HERNANDEZ   INDICATION: Screening.   COMPARISON: 07/15/2020   FINDINGS: 2D and tomosynthesis images were reviewed at 1 mm slice thickness.   Density:  There are areas of scattered fibroglandular tissue.   No suspicious masses or calcifications are identified. There are stable areas of asymmetry bilaterally.   This study was interpreted with CAD.       No mammographic evidence of malignancy.     BI-RADS CATEGORY:   BI-RADS Category:  1 Negative. Recommendation:  Annual Screening. Recommended Date:  1 Year. Laterality:  Bilateral.   For any future breast imaging appointments, please call [phone]).     MACRO: None   Signed by: Morgan Joshi 7/25/2024 9:07 AM Dictation workstation:   [id number]        Radiology:     No orders to display       Physical Exam     General Appearance: alert and oriented to person, place and time, in no acute distress  Cardiovascular: normal rate, regular rhythm, normal S1 and S2, III/VI mid peak aortic stenosis  murmur, no rubs, clicks, or gallops,  no JVD  Pulmonary/Chest: clear to auscultation bilaterally- no wheezes, rales or rhonchi, normal air movement, no respiratory distress  Abdomen: soft, non-tender, non-distended, normal bowel sounds, no masses   Extremities: no cyanosis, clubbing or edema  Skin: warm and dry, no rash or erythema  Eyes: EOMI  Neck: supple and non-tender without mass, no thyromegaly   Neurological: alert, oriented, normal speech, no focal findings or movement disorder noted  Vascular:       Scribe Attestation  By  signing my name below, I, NITZA HOLDEN RN   , Scribe   attest that this documentation has been prepared under the direction and in the presence of Frank Fuentes MD.

## 2024-11-21 NOTE — PATIENT INSTRUCTIONS
6 month follow up    DID YOU KNOW  We have a pharmacy here in the [facility].  They can fill all prescriptions, not just cardiac medications.  Prescriptions from other pharmacies can easily be transferred to the  pharmacy by the  pharmacist on site.   pharmacies offer FREE HOME DELIVERY on medications to anywhere in [place]. They can sync your medications. Typically prescriptions can be ready in 10 - 15 minutes. If pharmacy is unable to fill your  prescription or if cost is more than your paying now the Pharmacist can easily transfer back to your Pharmacy of choice. Pharmacy phone # [phone].     Please bring all medicines, vitamins, and herbal supplements with you in original bottles to every appointment  Prescriptions will not be filled unless you are compliant with your follow up appointments or have a follow up appointment scheduled as per instruction of your physician. Refills should be requested at the time of your visit.

## 2024-11-21 NOTE — PROGRESS NOTES
History of Present Illness:  Presents for evaluation of left antecubital fossa mass.  States she has had a palpable mass present to this area for 6 months.  Ultrasound obtained in June.  States that this is not bothersome except for when she pushes on this.  Then she has pain isolated to the mass.  Otherwise she does not notice it.  Denies numbness or tingling to this extremity unsure if there has been a change in size over the past 6 months.     Review of Systems   GENERAL: Negative for malaise, significant weight loss, fever  MUSCULOSKELETAL: see HPI  NEURO:  Negative    The patient's past medical history, family history, social history, and review of systems were reviewed. History is otherwise negative except as stated in the HPI.    Physical Examination:  General: Alert and oriented to person, place, and time.  No acute distress and breathing comfortably: Pleasant and cooperative with examination.  HEENT: Head is normocephalic and atraumatic.  Neck: Supple, no visible swelling.  Cardiovascular: No palpable tachycardia  Lungs: No audible wheezing or labored breathing  Abdomen: Nondistended.    On musculoskeletal examination,   1 x 2 cm mass present over the antecubital fossa.  Tenderness palpation associated.  She is able to fully extend and flex her elbow.  Full pronation supination.  Elbow stable to varus and valgus stress.  Able to make a full composite fist.  Able to fully extend all her digits.  AIN PIN ulnar intact.  Hand is warm well-perfused out.  Sensation tact light touch throughout    Imaging:  Radiographic notes: Ultrasound of the left elbow from June revealed a echogenic lesion in the antecubital fossa.  This was measured to be 0.6 x 0.5 cm.      Assessment:  Patient with Left elbow antecubital fossa mass.  Ultrasound in June.  Feels slight increase in size over the past 6 months.  Recommend MRI to fully evaluate.    Plan:  MRI left elbow to evaluate antecubital fossa mass.    Follow up: After  MRI    Leisa Gil MD

## 2024-12-06 ENCOUNTER — HOSPITAL ENCOUNTER (OUTPATIENT)
Dept: RADIOLOGY | Facility: HOSPITAL | Age: 79
Discharge: HOME | End: 2024-12-06
Payer: COMMERCIAL

## 2024-12-06 DIAGNOSIS — M25.522 LEFT ELBOW PAIN: ICD-10-CM

## 2024-12-06 DIAGNOSIS — M21.922 ACQUIRED DEFORMITY OF LEFT ELBOW: ICD-10-CM

## 2024-12-06 PROCEDURE — 73221 MRI JOINT UPR EXTREM W/O DYE: CPT | Mod: LT

## 2024-12-13 ENCOUNTER — LAB (OUTPATIENT)
Dept: LAB | Facility: LAB | Age: 79
End: 2024-12-13
Payer: COMMERCIAL

## 2024-12-13 DIAGNOSIS — D64.9 ANEMIA, UNSPECIFIED TYPE: ICD-10-CM

## 2024-12-13 LAB
BASOPHILS # BLD AUTO: 0.03 X10*3/UL (ref 0–0.1)
BASOPHILS NFR BLD AUTO: 0.5 %
EOSINOPHIL # BLD AUTO: 0.21 X10*3/UL (ref 0–0.4)
EOSINOPHIL NFR BLD AUTO: 3.4 %
ERYTHROCYTE [DISTWIDTH] IN BLOOD BY AUTOMATED COUNT: 20.8 % (ref 11.5–14.5)
FOLATE SERPL-MCNC: 16.4 NG/ML
HCT VFR BLD AUTO: 32.7 % (ref 36–46)
HGB BLD-MCNC: 9.6 G/DL (ref 12–16)
IMM GRANULOCYTES # BLD AUTO: 0.02 X10*3/UL (ref 0–0.5)
IMM GRANULOCYTES NFR BLD AUTO: 0.3 % (ref 0–0.9)
LYMPHOCYTES # BLD AUTO: 1.1 X10*3/UL (ref 0.8–3)
LYMPHOCYTES NFR BLD AUTO: 17.7 %
MCH RBC QN AUTO: 24.7 PG (ref 26–34)
MCHC RBC AUTO-ENTMCNC: 29.4 G/DL (ref 32–36)
MCV RBC AUTO: 84 FL (ref 80–100)
MONOCYTES # BLD AUTO: 0.76 X10*3/UL (ref 0.05–0.8)
MONOCYTES NFR BLD AUTO: 12.2 %
NEUTROPHILS # BLD AUTO: 4.11 X10*3/UL (ref 1.6–5.5)
NEUTROPHILS NFR BLD AUTO: 65.9 %
NRBC BLD-RTO: 0 /100 WBCS (ref 0–0)
OVALOCYTES BLD QL SMEAR: NORMAL
PLATELET # BLD AUTO: 332 X10*3/UL (ref 150–450)
RBC # BLD AUTO: 3.88 X10*6/UL (ref 4–5.2)
RBC MORPH BLD: NORMAL
VIT B12 SERPL-MCNC: 1797 PG/ML (ref 211–911)
WBC # BLD AUTO: 6.2 X10*3/UL (ref 4.4–11.3)

## 2024-12-13 PROCEDURE — 82746 ASSAY OF FOLIC ACID SERUM: CPT

## 2024-12-13 PROCEDURE — 85025 COMPLETE CBC W/AUTO DIFF WBC: CPT

## 2024-12-13 PROCEDURE — 82607 VITAMIN B-12: CPT

## 2024-12-13 PROCEDURE — 36415 COLL VENOUS BLD VENIPUNCTURE: CPT

## 2024-12-16 ENCOUNTER — APPOINTMENT (OUTPATIENT)
Dept: PRIMARY CARE | Facility: CLINIC | Age: 79
End: 2024-12-16
Payer: COMMERCIAL

## 2024-12-16 VITALS
DIASTOLIC BLOOD PRESSURE: 60 MMHG | HEART RATE: 76 BPM | WEIGHT: 149 LBS | HEIGHT: 62 IN | TEMPERATURE: 97 F | RESPIRATION RATE: 18 BRPM | SYSTOLIC BLOOD PRESSURE: 110 MMHG | OXYGEN SATURATION: 98 % | BODY MASS INDEX: 27.42 KG/M2

## 2024-12-16 DIAGNOSIS — D17.22 LIPOMA OF LEFT UPPER EXTREMITY: Primary | ICD-10-CM

## 2024-12-16 DIAGNOSIS — E78.49 OTHER HYPERLIPIDEMIA: ICD-10-CM

## 2024-12-16 DIAGNOSIS — I65.23 BILATERAL CAROTID ARTERY STENOSIS: ICD-10-CM

## 2024-12-16 DIAGNOSIS — K21.00 GASTROESOPHAGEAL REFLUX DISEASE WITH ESOPHAGITIS, UNSPECIFIED WHETHER HEMORRHAGE: ICD-10-CM

## 2024-12-16 DIAGNOSIS — I10 PRIMARY HYPERTENSION: ICD-10-CM

## 2024-12-16 DIAGNOSIS — D64.9 ANEMIA, UNSPECIFIED TYPE: ICD-10-CM

## 2024-12-16 DIAGNOSIS — I35.2 AORTIC VALVE STENOSIS WITH INSUFFICIENCY, ETIOLOGY OF CARDIAC VALVE DISEASE UNSPECIFIED: ICD-10-CM

## 2024-12-16 DIAGNOSIS — E03.8 OTHER SPECIFIED HYPOTHYROIDISM: ICD-10-CM

## 2024-12-16 PROBLEM — D50.9 IRON DEFICIENCY ANEMIA: Status: ACTIVE | Noted: 2024-12-16

## 2024-12-16 PROCEDURE — G2211 COMPLEX E/M VISIT ADD ON: HCPCS | Performed by: FAMILY MEDICINE

## 2024-12-16 PROCEDURE — 1159F MED LIST DOCD IN RCRD: CPT | Performed by: FAMILY MEDICINE

## 2024-12-16 PROCEDURE — 1123F ACP DISCUSS/DSCN MKR DOCD: CPT | Performed by: FAMILY MEDICINE

## 2024-12-16 PROCEDURE — 3074F SYST BP LT 130 MM HG: CPT | Performed by: FAMILY MEDICINE

## 2024-12-16 PROCEDURE — 99213 OFFICE O/P EST LOW 20 MIN: CPT | Performed by: FAMILY MEDICINE

## 2024-12-16 PROCEDURE — 3078F DIAST BP <80 MM HG: CPT | Performed by: FAMILY MEDICINE

## 2024-12-16 PROCEDURE — 1036F TOBACCO NON-USER: CPT | Performed by: FAMILY MEDICINE

## 2024-12-16 RX ORDER — LOSARTAN POTASSIUM 50 MG/1
50 TABLET ORAL DAILY
Qty: 90 TABLET | Refills: 3 | Status: SHIPPED | OUTPATIENT
Start: 2024-12-16

## 2024-12-16 RX ORDER — AMLODIPINE BESYLATE 5 MG/1
5 TABLET ORAL DAILY
Qty: 90 TABLET | Refills: 3 | Status: SHIPPED | OUTPATIENT
Start: 2024-12-16 | End: 2025-12-16

## 2024-12-16 RX ORDER — LEVOTHYROXINE SODIUM 125 UG/1
125 TABLET ORAL DAILY
Qty: 90 TABLET | Refills: 3 | Status: SHIPPED | OUTPATIENT
Start: 2024-12-16

## 2024-12-16 RX ORDER — HYDROCHLOROTHIAZIDE 12.5 MG/1
12.5 TABLET ORAL DAILY
Qty: 90 TABLET | Refills: 3 | Status: SHIPPED | OUTPATIENT
Start: 2024-12-16

## 2024-12-16 RX ORDER — ALENDRONATE SODIUM 70 MG/1
70 TABLET ORAL
Qty: 12 TABLET | Refills: 3 | Status: SHIPPED | OUTPATIENT
Start: 2024-12-16

## 2024-12-16 RX ORDER — PANTOPRAZOLE SODIUM 40 MG/1
40 TABLET, DELAYED RELEASE ORAL
Qty: 90 TABLET | Refills: 3 | Status: SHIPPED | OUTPATIENT
Start: 2024-12-16 | End: 2025-12-11

## 2024-12-16 RX ORDER — ROSUVASTATIN CALCIUM 40 MG/1
40 TABLET, COATED ORAL DAILY
Qty: 90 TABLET | Refills: 3 | Status: SHIPPED | OUTPATIENT
Start: 2024-12-16

## 2024-12-16 RX ORDER — CLOPIDOGREL BISULFATE 75 MG/1
75 TABLET ORAL DAILY
Qty: 90 TABLET | Refills: 3 | Status: SHIPPED | OUTPATIENT
Start: 2024-12-16

## 2024-12-16 RX ORDER — FERROUS GLUCONATE 324(37.5)
TABLET ORAL
Qty: 36 TABLET | Refills: 3 | Status: SHIPPED | OUTPATIENT
Start: 2024-12-16

## 2024-12-16 ASSESSMENT — ENCOUNTER SYMPTOMS
DIARRHEA: 0
BLOOD IN STOOL: 0
FATIGUE: 1
BACK PAIN: 1
WEAKNESS: 0
NECK PAIN: 0
ARTHRALGIAS: 1
NUMBNESS: 0
RECTAL PAIN: 0
ABDOMINAL PAIN: 0
HEADACHES: 0
CONSTIPATION: 0
SHORTNESS OF BREATH: 0

## 2024-12-16 NOTE — PROGRESS NOTES
Subjective   Patient ID: Maya Haines is a 79 y.o. female who presents for Follow-up.  HPI  Patient had orthopedic surgery 5/23.  Following the surgery she had good recovery in 11/27/2023 her hemoglobin was 12.4 following notes 11/14/2024 she dropped back down to 7.9 hemoglobin.  She has had no surgeries from that may surgery and denies black stool passing blood or vomiting of blood or abdominal pain or spotting.  She also denies donating blood.  Review of Systems   Constitutional:  Positive for fatigue.   HENT:  Negative for nosebleeds.    Respiratory:  Negative for shortness of breath.    Cardiovascular:  Negative for chest pain.   Gastrointestinal:  Negative for abdominal pain, blood in stool, constipation, diarrhea and rectal pain.   Musculoskeletal:  Positive for arthralgias and back pain. Negative for neck pain.   Neurological:  Negative for weakness, numbness and headaches.       Objective   Physical Exam  general: alert oriented x three  HEENT hearing normal to voice  Neck supple  Lungs respirations non-labored.  Cardiovascular: no peripheral edema  Skin: warm and dry without rash  Psych: judgement and insight normal  Musculoskeletal:  ambulation normal,    lymph:negative cervical  LYMPADENOPATHY  thyroid: non palpable enlargement v  Labs        Assessment/Plan   Problem List Items Addressed This Visit       Aortic insufficiency with aortic stenosis    Relevant Medications    amLODIPine (Norvasc) 5 mg tablet    clopidogrel (Plavix) 75 mg tablet    GERD (gastroesophageal reflux disease)    Relevant Medications    pantoprazole (ProtoNix) 40 mg EC tablet    Hyperlipidemia    Relevant Medications    amLODIPine (Norvasc) 5 mg tablet    rosuvastatin (Crestor) 40 mg tablet    Other Relevant Orders    Lipid Panel    Primary hypertension    Relevant Medications    alendronate (Fosamax) 70 mg tablet    losartan (Cozaar) 50 mg tablet    hydroCHLOROthiazide (Microzide) 12.5 mg tablet    Other Relevant Orders     Comprehensive Metabolic Panel    Magnesium    Hypothyroidism    Relevant Medications    levothyroxine (Synthroid, Levoxyl) 125 mcg tablet    Bilateral carotid artery stenosis    Relevant Medications    clopidogrel (Plavix) 75 mg tablet    Lipoma of left upper extremity - Primary      Lipoma of elbow and MRI noted..  MRI dated 11/21 showed a 9 mm mass compatible with lipoma if persistent pain back to surgery          Other Visit Diagnoses       Anemia, unspecified type        Relevant Medications    ferrous gluconate 324 (38 Fe) mg tablet    Other Relevant Orders    Referral to Gastroenterology    CBC and Auto Differential

## 2024-12-16 NOTE — ASSESSMENT & PLAN NOTE
Reviewed lab testing discussed with patient will take iron 2-3 times weekly with vitamin C.  In addition high iron diet and consistent with spinach/broccoli/eggs/rounds.  In light of uncertainty back to GI for question upper or lower question angiodysplasia and the lab order given for 6 months prior to next visit.  In light of low iron testing continue iron until next visit labs prior to next visit

## 2024-12-16 NOTE — ASSESSMENT & PLAN NOTE
Lipoma of elbow and MRI noted..  MRI dated 11/21 showed a 9 mm mass compatible with lipoma if persistent pain back to surgery

## 2024-12-17 ENCOUNTER — APPOINTMENT (OUTPATIENT)
Dept: PRIMARY CARE | Facility: CLINIC | Age: 79
End: 2024-12-17
Payer: COMMERCIAL

## 2025-01-09 ENCOUNTER — TELEPHONE (OUTPATIENT)
Dept: CARDIOLOGY | Facility: CLINIC | Age: 80
End: 2025-01-09

## 2025-01-09 ENCOUNTER — APPOINTMENT (OUTPATIENT)
Dept: SURGERY | Facility: CLINIC | Age: 80
End: 2025-01-09
Payer: COMMERCIAL

## 2025-01-09 VITALS
BODY MASS INDEX: 27.6 KG/M2 | HEART RATE: 81 BPM | DIASTOLIC BLOOD PRESSURE: 65 MMHG | HEIGHT: 62 IN | OXYGEN SATURATION: 99 % | WEIGHT: 150 LBS | SYSTOLIC BLOOD PRESSURE: 149 MMHG

## 2025-01-09 DIAGNOSIS — D50.9 IRON DEFICIENCY ANEMIA, UNSPECIFIED IRON DEFICIENCY ANEMIA TYPE: Primary | ICD-10-CM

## 2025-01-09 PROCEDURE — 1123F ACP DISCUSS/DSCN MKR DOCD: CPT | Performed by: SURGERY

## 2025-01-09 PROCEDURE — 3077F SYST BP >= 140 MM HG: CPT | Performed by: SURGERY

## 2025-01-09 PROCEDURE — 1159F MED LIST DOCD IN RCRD: CPT | Performed by: SURGERY

## 2025-01-09 PROCEDURE — 99213 OFFICE O/P EST LOW 20 MIN: CPT | Performed by: SURGERY

## 2025-01-09 PROCEDURE — 1036F TOBACCO NON-USER: CPT | Performed by: SURGERY

## 2025-01-09 PROCEDURE — 3078F DIAST BP <80 MM HG: CPT | Performed by: SURGERY

## 2025-01-09 ASSESSMENT — ENCOUNTER SYMPTOMS
JOINT SWELLING: 1
ARTHRALGIAS: 1

## 2025-01-09 NOTE — H&P (VIEW-ONLY)
Subjective   Patient ID: Maya Haines is a 80 y.o. female who presents for New Patient Visit.  HPI  80-year-old female referred for anemia.  Patient has minimal symptoms last colonoscopy 2020 patient had a tortuous colon.  No upper or lower GI alarm symptoms no rectal bleeding minimal abdominal pain reports increased gas pain she is on Plavix and aspirin.  Review of Systems   Musculoskeletal:  Positive for arthralgias, gait problem and joint swelling.   All other systems reviewed and are negative.      Objective   Physical Exam  Physical Exam  Constitutional:       Appearance: Normal appearance.   HENT:      Head: Normocephalic and atraumatic.      Mouth/Throat:      Pharynx: Oropharynx is clear.   Eyes:      Pupils: Pupils are equal, round, and reactive to light.   Cardiovascular:      Rate and Rhythm: Normal rate and regular rhythm.   Pulmonary:      Effort: Pulmonary effort is normal.      Breath sounds: Normal breath sounds.   Abdominal:      General: Abdomen is flat. Bowel sounds are normal.      Palpations: Abdomen is soft.   Musculoskeletal:         General: Normal range of motion.      Cervical back: Normal range of motion.   Skin:     General: Skin is warm.   Neurological:      General: No focal deficit present.      Mental Status: She is alert. Mental status is at baseline.   Psychiatric:         Mood and Affect: Mood normal.     Assessment/Plan   Anemia needs egd and colonoscopy.pt has hx of difficult colonoscopy discussed with patient that she is higher than usual risk for complications she stated understanding at this time, please schedule for EGD and colonoscopy stop aspirin and Plavix 1 week before, the next 2 to 3 weeks         Christopher Guzmán MD 01/09/25 11:35 AM

## 2025-01-16 ENCOUNTER — CLINICAL SUPPORT (OUTPATIENT)
Dept: PREADMISSION TESTING | Facility: HOSPITAL | Age: 80
End: 2025-01-16
Payer: COMMERCIAL

## 2025-01-16 ENCOUNTER — TELEPHONE (OUTPATIENT)
Dept: CARDIOLOGY | Facility: CLINIC | Age: 80
End: 2025-01-16
Payer: COMMERCIAL

## 2025-01-16 NOTE — PREPROCEDURE INSTRUCTIONS

## 2025-01-16 NOTE — TELEPHONE ENCOUNTER
Surgical clearance form received from Dr. Cabrales for Colonoscopy.  Form to Dr. Frank Fuentes, NARCISO Shore, CMA

## 2025-01-17 NOTE — TELEPHONE ENCOUNTER
Form reviewed and signed by Dr. Fuentes. Ok for procedure. Ok to hold Aspirin and Plavix 5-7 days prior. Faxed and scanned to chart.

## 2025-01-28 ENCOUNTER — HOSPITAL ENCOUNTER (OUTPATIENT)
Dept: GASTROENTEROLOGY | Facility: HOSPITAL | Age: 80
Discharge: HOME | End: 2025-01-28
Payer: COMMERCIAL

## 2025-01-28 ENCOUNTER — ANESTHESIA (OUTPATIENT)
Dept: GASTROENTEROLOGY | Facility: HOSPITAL | Age: 80
End: 2025-01-28
Payer: COMMERCIAL

## 2025-01-28 ENCOUNTER — ANESTHESIA EVENT (OUTPATIENT)
Dept: GASTROENTEROLOGY | Facility: HOSPITAL | Age: 80
End: 2025-01-28
Payer: COMMERCIAL

## 2025-01-28 VITALS
WEIGHT: 145.94 LBS | BODY MASS INDEX: 26.86 KG/M2 | HEART RATE: 72 BPM | SYSTOLIC BLOOD PRESSURE: 126 MMHG | HEIGHT: 62 IN | TEMPERATURE: 96.4 F | DIASTOLIC BLOOD PRESSURE: 59 MMHG | OXYGEN SATURATION: 97 % | RESPIRATION RATE: 15 BRPM

## 2025-01-28 DIAGNOSIS — D50.9 IRON DEFICIENCY ANEMIA, UNSPECIFIED IRON DEFICIENCY ANEMIA TYPE: Primary | ICD-10-CM

## 2025-01-28 PROCEDURE — 2500000005 HC RX 250 GENERAL PHARMACY W/O HCPCS: Performed by: NURSE ANESTHETIST, CERTIFIED REGISTERED

## 2025-01-28 PROCEDURE — 7100000009 HC PHASE TWO TIME - INITIAL BASE CHARGE

## 2025-01-28 PROCEDURE — 7100000010 HC PHASE TWO TIME - EACH INCREMENTAL 1 MINUTE

## 2025-01-28 PROCEDURE — 3700000002 HC GENERAL ANESTHESIA TIME - EACH INCREMENTAL 1 MINUTE

## 2025-01-28 PROCEDURE — 43235 EGD DIAGNOSTIC BRUSH WASH: CPT | Performed by: SURGERY

## 2025-01-28 PROCEDURE — 2500000004 HC RX 250 GENERAL PHARMACY W/ HCPCS (ALT 636 FOR OP/ED): Performed by: NURSE ANESTHETIST, CERTIFIED REGISTERED

## 2025-01-28 PROCEDURE — 3700000001 HC GENERAL ANESTHESIA TIME - INITIAL BASE CHARGE

## 2025-01-28 PROCEDURE — 45385 COLONOSCOPY W/LESION REMOVAL: CPT | Performed by: SURGERY

## 2025-01-28 RX ORDER — ETOMIDATE 2 MG/ML
INJECTION INTRAVENOUS AS NEEDED
Status: DISCONTINUED | OUTPATIENT
Start: 2025-01-28 | End: 2025-01-28

## 2025-01-28 RX ORDER — PROPOFOL 10 MG/ML
INJECTION, EMULSION INTRAVENOUS AS NEEDED
Status: DISCONTINUED | OUTPATIENT
Start: 2025-01-28 | End: 2025-01-28

## 2025-01-28 RX ORDER — ONDANSETRON HYDROCHLORIDE 2 MG/ML
INJECTION, SOLUTION INTRAVENOUS AS NEEDED
Status: DISCONTINUED | OUTPATIENT
Start: 2025-01-28 | End: 2025-01-28

## 2025-01-28 RX ORDER — LIDOCAINE HYDROCHLORIDE 20 MG/ML
INJECTION, SOLUTION EPIDURAL; INFILTRATION; INTRACAUDAL; PERINEURAL AS NEEDED
Status: DISCONTINUED | OUTPATIENT
Start: 2025-01-28 | End: 2025-01-28

## 2025-01-28 RX ADMIN — PROPOFOL 50 MG: 10 INJECTION, EMULSION INTRAVENOUS at 14:42

## 2025-01-28 RX ADMIN — ETOMIDATE 6 MG: 40 INJECTION, SOLUTION INTRAVENOUS at 14:28

## 2025-01-28 RX ADMIN — PROPOFOL 50 MG: 10 INJECTION, EMULSION INTRAVENOUS at 14:46

## 2025-01-28 RX ADMIN — ETOMIDATE 4 MG: 40 INJECTION, SOLUTION INTRAVENOUS at 14:35

## 2025-01-28 RX ADMIN — PROPOFOL 50 MG: 10 INJECTION, EMULSION INTRAVENOUS at 14:28

## 2025-01-28 RX ADMIN — PROPOFOL 50 MG: 10 INJECTION, EMULSION INTRAVENOUS at 14:20

## 2025-01-28 RX ADMIN — PROPOFOL 50 MG: 10 INJECTION, EMULSION INTRAVENOUS at 14:35

## 2025-01-28 RX ADMIN — ONDANSETRON 4 MG: 2 INJECTION, SOLUTION INTRAMUSCULAR; INTRAVENOUS at 14:20

## 2025-01-28 RX ADMIN — PROPOFOL 50 MG: 10 INJECTION, EMULSION INTRAVENOUS at 14:24

## 2025-01-28 RX ADMIN — PROPOFOL 50 MG: 10 INJECTION, EMULSION INTRAVENOUS at 14:32

## 2025-01-28 RX ADMIN — PROPOFOL 50 MG: 10 INJECTION, EMULSION INTRAVENOUS at 14:39

## 2025-01-28 RX ADMIN — LIDOCAINE HYDROCHLORIDE 100 MG: 20 INJECTION, SOLUTION EPIDURAL; INFILTRATION; INTRACAUDAL; PERINEURAL at 14:20

## 2025-01-28 RX ADMIN — ETOMIDATE 10 MG: 40 INJECTION, SOLUTION INTRAVENOUS at 14:20

## 2025-01-28 ASSESSMENT — COLUMBIA-SUICIDE SEVERITY RATING SCALE - C-SSRS
1. IN THE PAST MONTH, HAVE YOU WISHED YOU WERE DEAD OR WISHED YOU COULD GO TO SLEEP AND NOT WAKE UP?: NO
2. HAVE YOU ACTUALLY HAD ANY THOUGHTS OF KILLING YOURSELF?: NO
6. HAVE YOU EVER DONE ANYTHING, STARTED TO DO ANYTHING, OR PREPARED TO DO ANYTHING TO END YOUR LIFE?: NO

## 2025-01-28 ASSESSMENT — PAIN - FUNCTIONAL ASSESSMENT
PAIN_FUNCTIONAL_ASSESSMENT: 0-10

## 2025-01-28 ASSESSMENT — PAIN SCALES - GENERAL
PAINLEVEL_OUTOF10: 2
PAIN_LEVEL: 0
PAINLEVEL_OUTOF10: 0 - NO PAIN
PAINLEVEL_OUTOF10: 0 - NO PAIN

## 2025-01-28 ASSESSMENT — PAIN DESCRIPTION - DESCRIPTORS: DESCRIPTORS: BURNING

## 2025-01-28 NOTE — Clinical Note
Patient tolerated procedure well. Appears comfortable with no complaints of pain. VS stable. Arousable prior to transport. Patient transported to [facility] via cart.  Report called per CRNA.  Handoff completed.

## 2025-01-28 NOTE — Clinical Note
Huddle and Timeout completed together with team. Patient wristband and DONNY information verified.  Anesthesia safety check completed

## 2025-01-28 NOTE — ANESTHESIA POSTPROCEDURE EVALUATION
Patient: Maya Haines    Procedure Summary       Date: 01/28/25 Room / Location: [facility]    Anesthesia Start: 1409 Anesthesia Stop:     Procedures:       COLONOSCOPY      EGD Diagnosis:       Iron deficiency anemia, unspecified iron deficiency anemia type      Iron deficiency anemia, unspecified iron deficiency anemia type    Scheduled Providers: Christopher AUSTIN MD; Clyde Goodwin DO; Severiano Owens RN; Marquita Ruff Responsible Provider: Clyde Goodwin DO    Anesthesia Type: MAC ASA Status: 3            Anesthesia Type: MAC    Vitals Value Taken Time   /62 01/28/25 1449   Temp 36.5 01/28/25 1449   Pulse 72 01/28/25 1449   Resp 18 01/28/25 1449   SpO2 99 01/28/25 1449       Anesthesia Post Evaluation    Patient location during evaluation: bedside  Patient participation: complete - patient participated  Level of consciousness: awake and alert  Pain score: 0  Pain management: adequate  Airway patency: patent  Cardiovascular status: acceptable and stable  Respiratory status: acceptable and room air  Hydration status: acceptable  Postoperative Nausea and Vomiting: none    No notable events documented.

## 2025-01-28 NOTE — DISCHARGE INSTRUCTIONS
Moderate Sedation in Adults Discharge Instructions    About this topic  Moderate sedation is also known as conscious sedation. It changes your state of being awake or consciousness. With this sedation, you may feel slight pain or pressure during a procedure. The drugs help you to relax and may even allow you to sleep. It will be easy to wake you and you may talk and answer questions while under sedation. Most likely, you will not remember what happens while under this sedation.  What care is needed at home?  Ask your doctor what you need to do when you go home. Make sure you understand everything the doctor says. This way you will know what you need to do.  You will not be allowed to drive right away after the procedure. Ask a family member or a friend to drive you home.  Do not operate heavy or dangerous machinery for at least 24 hours.  Do not make major decisions or sign important papers for at least 24 hours. You may not be thinking clearly.  Avoid beer, wine, or mixed drinks (alcohol) for at least 24 hours.  You are at a higher risk of falling for at least 24 hours after moderate sedation.  Take extra care when you get up.  Do not change positions quickly.  Do not rush when you need to go to the bathroom or to answer the phone.  Ask for help if you feel unsteady when you try to walk.  Wear shoes with non-slip soles and low heels.  What follow-up care is needed?  Your doctor may ask you to make visits to the office to check on your progress. Be sure to keep these visits. Your doctor may also refer you to other doctors or tell you that you need more tests or care.  What drugs may be needed?  The doctor may order drugs to:  Help with pain  Treat an upset stomach or throwing up  Will physical activity be limited?  Rest for the day of the procedure. Avoid strenuous activities like heavy lifting and hard exercise. Talk to your doctor about whether you need to limit lifting or exercise after your procedure.  What  changes to diet are needed?  Start with a light diet when you are fully awake. This includes things that are easy to swallow like soups, pudding, jello, toast, and eggs. Slowly progress to your normal diet.  What problems could happen?  Low blood pressure  Breathing problems  Upset stomach or throwing up  Dizziness  When do I need to call the doctor?  Feel dizzy, weak, or tired  Faint  Very bad headache  Upset stomach or throwing up  To follow up for more tests or care  Teach Back: Helping You Understand  The Teach Back Method helps you understand the information we are giving you. After you talk with the staff, tell them in your own words what you learned. This helps to make sure the staff has described each thing clearly. It also helps to explain things that may have been confusing. Before going home, make sure you can do these:  I can tell you about my procedure.  I can tell you if I need more tests or care.  I can tell you what is good for me to eat and drink the next day.  I can tell you what I would do if I feel dizzy, weak, or tired.  Last Reviewed Date  2020-03-02  Physician phone list provided

## 2025-01-28 NOTE — NURSING NOTE
Dr. Arielle rodriguez chatted to ask when pt can resume Plavix. He replied tomorrow and pt and pt's  updated.

## 2025-01-28 NOTE — POST-PROCEDURE NOTE
Pt returned from EGD/colonoscopy at 1505. Alert, oriented, VSS, tolerating po fluid and snack. Denies pain or nausea. Discharge instructions printed and reviewed with pt and pt's , including physician phone list. Pt to discharge home via w/c accompanied by transport

## 2025-01-28 NOTE — ANESTHESIA PREPROCEDURE EVALUATION
Patient: Maya Haines    Procedure Information       Date/Time: 01/28/25 1310    Scheduled providers: Christopher AUSTIN MD; Clyde Goodwin DO; Severiano Owens RN; Marquita Ruff    Procedures:       COLONOSCOPY      EGD    Location: [facility]            Relevant Problems   Cardiac   (+) Aortic insufficiency with aortic stenosis   (+) Hyperlipidemia   (+) Primary hypertension      Pulmonary   (+) Asthma   (+) Shortness of breath on exertion      Neuro   (+) Bilateral carotid artery stenosis      GI   (+) GERD (gastroesophageal reflux disease)      Endocrine   (+) Hyperthyroidism   (+) Hypothyroidism      Hematology   (+) Iron deficiency anemia      Musculoskeletal   (+) Fibromyalgia   (+) Lumbar spinal stenosis   (+) Osteoarthritis of knee      HEENT   (+) Sinobronchitis       Clinical information reviewed:    Allergies  Meds               NPO Detail:  NPO/Void Status  Carbohydrate Drink Given Prior to Surgery? : N  Date of Last Liquid: 01/27/25  Time of Last Liquid: 1800  Date of Last Solid: 01/26/25  Time of Last Solid: 1900  Last Intake Type: Clear fluids  Time of Last Void: 1159         Physical Exam    Airway  Mallampati: II     Cardiovascular   Rhythm: regular  Rate: normal     Dental    Pulmonary - normal exam     Abdominal - normal exam             Anesthesia Plan    History of general anesthesia?: yes  History of complications of general anesthesia?: no    ASA 3     MAC     intravenous induction   Postoperative administration of opioids is intended.  Anesthetic plan and risks discussed with patient.

## 2025-02-04 LAB
LABORATORY COMMENT REPORT: NORMAL
PATH REPORT.FINAL DX SPEC: NORMAL
PATH REPORT.GROSS SPEC: NORMAL
PATH REPORT.RELEVANT HX SPEC: NORMAL
PATH REPORT.TOTAL CANCER: NORMAL

## 2025-02-12 ENCOUNTER — OFFICE VISIT (OUTPATIENT)
Dept: SURGERY | Facility: CLINIC | Age: 80
End: 2025-02-12
Payer: COMMERCIAL

## 2025-02-12 DIAGNOSIS — D50.9 IRON DEFICIENCY ANEMIA, UNSPECIFIED IRON DEFICIENCY ANEMIA TYPE: Primary | ICD-10-CM

## 2025-02-12 PROCEDURE — 99213 OFFICE O/P EST LOW 20 MIN: CPT | Performed by: SURGERY

## 2025-02-12 PROCEDURE — 1123F ACP DISCUSS/DSCN MKR DOCD: CPT | Performed by: SURGERY

## 2025-02-12 NOTE — PROGRESS NOTES
Subjective   Patient ID: Maya Haines is a 80 y.o. female who presents for Post-op.  HPI  Patient status post EGD and colonoscopy no procedure related complaints.  Patient's GERD is well-controlled with PPIs  Review of Systems   All other systems reviewed and are negative.      Objective   Physical Exam  Physical Exam  Constitutional:       Appearance: Normal appearance.   HENT:      Head: Normocephalic and atraumatic.      Mouth/Throat:      Pharynx: Oropharynx is clear.   Eyes:      Pupils: Pupils are equal, round, and reactive to light.   Cardiovascular:      Rate and Rhythm: Normal rate and regular rhythm.   Pulmonary:      Effort: Pulmonary effort is normal.      Breath sounds: Normal breath sounds.   Abdominal:      General: Abdomen is flat. Bowel sounds are normal.      Palpations: Abdomen is soft.   Musculoskeletal:         General: Normal range of motion.      Cervical back: Normal range of motion.   Skin:     General: Skin is warm.   Neurological:      General: No focal deficit present.      Mental Status: She is alert. Mental status is at baseline.   Psychiatric:         Mood and Affect: Mood normal.     Assessment/Plan   EGD showed a 6 cm hiatal hernia without any lesions.  Colonoscopy showed multiple polyps in the cecum all tubular adenomas findings discussed with the patient recommend that she see her PCP regarding further anemia workup will need repeat colonoscopy for surveillance in 3 years patient agreeable         Christopher Guzmán MD 02/12/25 2:34 PM

## 2025-05-12 ENCOUNTER — APPOINTMENT (OUTPATIENT)
Dept: PRIMARY CARE | Facility: CLINIC | Age: 80
End: 2025-05-12
Payer: COMMERCIAL

## 2025-05-12 ENCOUNTER — OFFICE VISIT (OUTPATIENT)
Dept: CARDIOLOGY | Facility: CLINIC | Age: 80
End: 2025-05-12
Payer: COMMERCIAL

## 2025-05-12 VITALS
HEART RATE: 81 BPM | SYSTOLIC BLOOD PRESSURE: 120 MMHG | BODY MASS INDEX: 28.6 KG/M2 | WEIGHT: 155.4 LBS | DIASTOLIC BLOOD PRESSURE: 50 MMHG | HEIGHT: 62 IN

## 2025-05-12 DIAGNOSIS — R06.02 SHORTNESS OF BREATH: ICD-10-CM

## 2025-05-12 DIAGNOSIS — I73.9 PERIPHERAL VASCULAR DISEASE, UNSPECIFIED: ICD-10-CM

## 2025-05-12 DIAGNOSIS — I65.23 BILATERAL CAROTID ARTERY STENOSIS: ICD-10-CM

## 2025-05-12 DIAGNOSIS — I35.2 NONRHEUMATIC AORTIC INSUFFICIENCY WITH AORTIC STENOSIS: ICD-10-CM

## 2025-05-12 PROCEDURE — 3078F DIAST BP <80 MM HG: CPT | Performed by: INTERNAL MEDICINE

## 2025-05-12 PROCEDURE — 99214 OFFICE O/P EST MOD 30 MIN: CPT | Performed by: INTERNAL MEDICINE

## 2025-05-12 PROCEDURE — 3074F SYST BP LT 130 MM HG: CPT | Performed by: INTERNAL MEDICINE

## 2025-05-12 PROCEDURE — G2211 COMPLEX E/M VISIT ADD ON: HCPCS | Performed by: INTERNAL MEDICINE

## 2025-05-12 PROCEDURE — 1159F MED LIST DOCD IN RCRD: CPT | Performed by: INTERNAL MEDICINE

## 2025-05-12 PROCEDURE — 1036F TOBACCO NON-USER: CPT | Performed by: INTERNAL MEDICINE

## 2025-05-12 NOTE — PROGRESS NOTES
Patient:  Maya Haines  YOB: 1945  MRN: [id number]       Impression/Plan:     Diagnoses and all orders for this visit:  Bilateral carotid artery stenosis  Peripheral vascular disease, unspecified (CMS-HCC)  -     Follows at [facility]  -     Currently no claudication or neurologic symptoms to suggest progression of carotid disease.  Noninvasive assessment followed at [facility]  -     Sinew full dose rosuvastatin  Nonrheumatic aortic insufficiency with aortic stenosis  -    Exam suggest mild aortic stenosis unlikely progression from mild disease to significant in the last 6 months no need to repeat echocardiogram at this time  Shortness of breath  -     Nuclear Stress Test; Future  -    I suspect her shortness of breath is related to deconditioning.  She has done quite a bit bit less activity than she has in the past.  She has been more sedentary and is trying to get out and be more active.  Nonetheless though she has not to date had significant coronary disease obviously at risk.  Would obtain Lexiscan perfusion to assure no development of coronary disease assuming this study satisfactory otherwise here in 6 months      Chief Complaint/Active Symptoms:      Chief Complaint   Patient presents with    Follow-up    Hypertension       Maya Haines is a 80 y.o. female who presents with  hypertension, hyperlipidemia carotid artery disease with left carotid stent 2023, angiographically normal coronary arteries 2003, congenital VSD with spontaneous closure as an adult, aortic stenosis, arthritis and hypothyroidism.  Carotid artery disease followed at [facility] Dr. Terry Fisher.       November 2024 EF by echo 70%.  Normal RV.  RVSP 46.  Mean aortic gradient 14 mmHg 1+ AI .         Asymptomatic when I saw her in the office 11/21/2024 November 2024 cholesterol 141 HDL 52 LDL 72, thyroid normal ferritin normal hemoglobin 9.6 which had improved from previously CMP unremarkable    Although no  clear-cut angina she is a bit more short of breath than when I saw her in November.  Her carotid and peripheral vascular disease followed closely at [facility] she has an appointment coming up in the very near future.  She has had no neurologic symptoms.     Review of Systems: Unremarkable except as noted above    Meds     Current Outpatient Medications   Medication Instructions    alendronate (FOSAMAX) 70 mg, oral, Once Weekly    amLODIPine (NORVASC) 5 mg, oral, Daily    ASPIRIN ORAL 81 mg, Daily    clopidogrel (PLAVIX) 75 mg, oral, Daily    cyanocobalamin (VITAMIN B-12) 2,500 mcg, oral, Daily    ferrous gluconate 324 (38 Fe) mg tablet Take 1 tablet 3 times weekly with vitamin C    hydroCHLOROthiazide (MICROZIDE) 12.5 mg, oral, Daily    levothyroxine (SYNTHROID, LEVOXYL) 125 mcg, oral, Daily    losartan (COZAAR) 50 mg, oral, Daily    magnesium oxide 500 mg, Once    pantoprazole (PROTONIX) 40 mg, oral, Daily RT    rosuvastatin (CRESTOR) 40 mg, oral, Daily    VIT B COMP NO.3-FOLIC-C-BIOTIN ORAL 1 tablet, Daily with breakfast    ZINC ACETATE ORAL 1 tablet, Daily        Allergies   Allergies[1]      Annotated Problems     Specialty Problems          Cardiology Problems    Aortic insufficiency with aortic stenosis    Mild aortic stenosis November 2024 EF by echo 70%.  Normal RV.  RVSP 46.  Mean aortic gradient 14 mmHg 1+ AI         Hyperlipidemia    2003 Cath with normal coronary arteries         Primary hypertension    Shortness of breath on exertion    Ventricular septal defect (HHS-HCC)    Spontaneous closure         Bilateral carotid artery stenosis    Feb 2024 Duplex shows widely patent Left stent, <40% REILLY  7/18/2023 left carotid stenting at [facility] Dr. 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/11/2024    Lipoma of left upper extremity 06/17/2024    Bilateral carotid artery stenosis  "05/14/2024    Elevated glucose 06/20/2023    Arthrofibrosis of knee joint, left 04/28/2023    Closed nondisplaced transverse fracture of left patella 04/28/2023    Abnormal mammogram 01/26/2023    Aortic insufficiency with aortic stenosis 01/26/2023    Asthma 01/26/2023    Change in bowel habit 01/26/2023    Fibromyalgia 01/26/2023    Foot pain 01/26/2023    GERD (gastroesophageal reflux disease) 01/26/2023    Hyperlipidemia 01/26/2023    Primary hypertension 01/26/2023    Hyperthyroidism 01/26/2023    Hypothyroidism 01/26/2023    Lumbar spinal stenosis 01/26/2023    Osteoarthritis of knee 01/26/2023    Osteopenia 01/26/2023    Osteoporosis 01/26/2023    Shortness of breath on exertion 01/26/2023    Sinobronchitis 01/26/2023    Stomatitis 01/26/2023    Ventricular septal defect (HHS-HCC) 01/26/2023    Vitamin D deficiency 01/26/2023    Vulvar dermatitis 01/26/2023    ACE-inhibitor cough 01/26/2023    Overweight with body mass index (BMI) of 27 to 27.9 in adult 01/26/2023    Senile cataract 06/03/2013       Objective:     Vitals:    05/12/25 0925   BP: 120/50   BP Location: Left arm   Patient Position: Sitting   Pulse: 81   Weight: 70.5 kg (155 lb 6.4 oz)   Height: 1.575 m (5' 2\")      Wt Readings from Last 4 Encounters:   05/12/25 70.5 kg (155 lb 6.4 oz)   01/28/25 66.2 kg (145 lb 15.1 oz)   01/09/25 68 kg (150 lb)   12/16/24 67.6 kg (149 lb)           LAB:     Lab Results   Component Value Date    WBC 6.2 12/13/2024    HGB 9.6 (L) 12/13/2024    HCT 32.7 (L) 12/13/2024     12/13/2024    CHOL 141 11/14/2024    TRIG 82 11/14/2024    HDL 52.3 11/14/2024    ALT 9 11/14/2024    AST 16 11/14/2024     11/14/2024    K 4.7 11/14/2024     (H) 11/14/2024    CREATININE 0.76 11/14/2024    BUN 13 11/14/2024    CO2 27 11/14/2024    TSH 5.74 (H) 11/14/2024    HGBA1C 6.1 05/20/2024         Physical Exam     General Appearance: alert and oriented to person, place and time, in no acute distress  Cardiovascular: " normal rate, regular rhythm, normal S1 and S2, 2/6 early peaking aortic stenosis murmur, no rubs, clicks, or gallops,  no JVD  Pulmonary/Chest: clear to auscultation bilaterally- no wheezes, rales or rhonchi, normal air movement, no respiratory distress  Abdomen: soft, non-tender, non-distended, normal bowel sounds, no masses   Extremities: no cyanosis, clubbing or edema  Skin: warm and dry, no rash or erythema  Eyes: EOMI  Neck: supple and non-tender without mass, no thyromegaly   Neurological: alert, oriented, normal speech, no focal findings or movement disorder noted  Vascular: Bilateral carotid bruits    Scribe Attestation  By signing my name below, I, Fatemeh Shore CMA   , Scribe   attest that this documentation has been prepared under the direction and in the presence of Frank Fuentes MD.      Provider attestation-scribe documentation  Any medical record entries made by the scribe were at my discretion and personally dictated by me.  I have reviewed the chart and agree that the record accurately reflects my personal performance of the history, physical exam, discussion and plan.                 [1]   Allergies  Allergen Reactions    Ace Inhibitors Cough    Codeine Hives    Lisinopril Cough

## 2025-05-12 NOTE — PATIENT INSTRUCTIONS
Please bring all medicines, vitamins, and herbal supplements with you in original bottles to every appointment! This is the best way to ensure your medication list in your chart is accurate.    Prescriptions will not be filled unless you are compliant with your follow up appointments or have a follow up appointment scheduled as per instruction of your physician. Refills should be requested at the time of your visit.    DID YOU KNOW  We have a pharmacy here in the [facility].  They can fill all prescriptions, not just cardiac medications.  Prescriptions from other pharmacies can easily be transferred to the  pharmacy by the  pharmacist on site.   pharmacies offer FREE HOME DELIVERY on medications to anywhere in [place]. They can sync your medications. Typically prescriptions can be ready in 10 - 15 minutes. If pharmacy is unable to fill your  prescription or if cost is more than your paying now the Pharmacist can easily transfer back to your Pharmacy of choice. Pharmacy phone # [phone].

## 2025-05-21 ENCOUNTER — APPOINTMENT (OUTPATIENT)
Dept: PRIMARY CARE | Facility: CLINIC | Age: 80
End: 2025-05-21
Payer: COMMERCIAL

## 2025-05-21 VITALS
HEART RATE: 82 BPM | HEIGHT: 62 IN | TEMPERATURE: 97 F | BODY MASS INDEX: 28.34 KG/M2 | DIASTOLIC BLOOD PRESSURE: 63 MMHG | SYSTOLIC BLOOD PRESSURE: 118 MMHG | OXYGEN SATURATION: 97 % | RESPIRATION RATE: 18 BRPM | WEIGHT: 154 LBS

## 2025-05-21 DIAGNOSIS — I10 PRIMARY HYPERTENSION: ICD-10-CM

## 2025-05-21 DIAGNOSIS — Z23 NEED FOR VACCINATION: ICD-10-CM

## 2025-05-21 DIAGNOSIS — H00.012 HORDEOLUM EXTERNUM OF RIGHT LOWER EYELID: ICD-10-CM

## 2025-05-21 DIAGNOSIS — K21.00 GASTROESOPHAGEAL REFLUX DISEASE WITH ESOPHAGITIS, UNSPECIFIED WHETHER HEMORRHAGE: ICD-10-CM

## 2025-05-21 DIAGNOSIS — E78.49 OTHER HYPERLIPIDEMIA: ICD-10-CM

## 2025-05-21 DIAGNOSIS — D64.9 ANEMIA, UNSPECIFIED TYPE: ICD-10-CM

## 2025-05-21 DIAGNOSIS — Z12.31 ENCOUNTER FOR SCREENING MAMMOGRAM FOR BREAST CANCER: ICD-10-CM

## 2025-05-21 DIAGNOSIS — H11.31 CONJUNCTIVAL HEMORRHAGE OF RIGHT EYE: ICD-10-CM

## 2025-05-21 DIAGNOSIS — L30.9 VULVAR DERMATITIS: ICD-10-CM

## 2025-05-21 DIAGNOSIS — E03.8 OTHER SPECIFIED HYPOTHYROIDISM: ICD-10-CM

## 2025-05-21 DIAGNOSIS — Z00.00 ROUTINE GENERAL MEDICAL EXAMINATION AT HEALTH CARE FACILITY: Primary | ICD-10-CM

## 2025-05-21 DIAGNOSIS — Z13.220 LIPID SCREENING: ICD-10-CM

## 2025-05-21 PROCEDURE — 1170F FXNL STATUS ASSESSED: CPT | Performed by: FAMILY MEDICINE

## 2025-05-21 PROCEDURE — 1036F TOBACCO NON-USER: CPT | Performed by: FAMILY MEDICINE

## 2025-05-21 PROCEDURE — G0439 PPPS, SUBSEQ VISIT: HCPCS | Performed by: FAMILY MEDICINE

## 2025-05-21 PROCEDURE — 3078F DIAST BP <80 MM HG: CPT | Performed by: FAMILY MEDICINE

## 2025-05-21 PROCEDURE — 1124F ACP DISCUSS-NO DSCNMKR DOCD: CPT | Performed by: FAMILY MEDICINE

## 2025-05-21 PROCEDURE — 99214 OFFICE O/P EST MOD 30 MIN: CPT | Performed by: FAMILY MEDICINE

## 2025-05-21 PROCEDURE — 1159F MED LIST DOCD IN RCRD: CPT | Performed by: FAMILY MEDICINE

## 2025-05-21 PROCEDURE — 1160F RVW MEDS BY RX/DR IN RCRD: CPT | Performed by: FAMILY MEDICINE

## 2025-05-21 PROCEDURE — 3074F SYST BP LT 130 MM HG: CPT | Performed by: FAMILY MEDICINE

## 2025-05-21 RX ORDER — ROSUVASTATIN CALCIUM 40 MG/1
40 TABLET, COATED ORAL DAILY
Qty: 90 TABLET | Refills: 3 | Status: SHIPPED | OUTPATIENT
Start: 2025-05-21

## 2025-05-21 RX ORDER — ALENDRONATE SODIUM 70 MG/1
70 TABLET ORAL
Qty: 12 TABLET | Refills: 3 | Status: SHIPPED | OUTPATIENT
Start: 2025-05-25

## 2025-05-21 RX ORDER — LEVOTHYROXINE SODIUM 125 UG/1
125 TABLET ORAL DAILY
Qty: 90 TABLET | Refills: 3 | Status: SHIPPED | OUTPATIENT
Start: 2025-05-21

## 2025-05-21 RX ORDER — AMLODIPINE BESYLATE 5 MG/1
5 TABLET ORAL DAILY
Qty: 90 TABLET | Refills: 3 | Status: SHIPPED | OUTPATIENT
Start: 2025-05-21 | End: 2026-05-21

## 2025-05-21 RX ORDER — CYCLOBENZAPRINE HCL 5 MG
TABLET ORAL
COMMUNITY
Start: 2025-05-14

## 2025-05-21 RX ORDER — LOSARTAN POTASSIUM 50 MG/1
50 TABLET ORAL DAILY
Qty: 90 TABLET | Refills: 3 | Status: SHIPPED | OUTPATIENT
Start: 2025-05-21

## 2025-05-21 RX ORDER — FERROUS GLUCONATE 324(37.5)
TABLET ORAL
Qty: 36 TABLET | Refills: 3 | Status: SHIPPED | OUTPATIENT
Start: 2025-05-21

## 2025-05-21 RX ORDER — PANTOPRAZOLE SODIUM 40 MG/1
40 TABLET, DELAYED RELEASE ORAL
Qty: 90 TABLET | Refills: 3 | Status: SHIPPED | OUTPATIENT
Start: 2025-05-21 | End: 2026-05-16

## 2025-05-21 RX ORDER — TRIAMCINOLONE ACETONIDE 1 MG/G
CREAM TOPICAL 2 TIMES DAILY
Qty: 30 G | Refills: 0 | Status: SHIPPED | OUTPATIENT
Start: 2025-05-21

## 2025-05-21 RX ORDER — ERYTHROMYCIN 5 MG/G
OINTMENT OPHTHALMIC
Qty: 3.5 G | Refills: 0 | Status: SHIPPED | OUTPATIENT
Start: 2025-05-21

## 2025-05-21 RX ORDER — HYDROCHLOROTHIAZIDE 12.5 MG/1
12.5 TABLET ORAL DAILY
Qty: 90 TABLET | Refills: 3 | Status: SHIPPED | OUTPATIENT
Start: 2025-05-21

## 2025-05-21 ASSESSMENT — ACTIVITIES OF DAILY LIVING (ADL)
MANAGING_FINANCES: INDEPENDENT
TAKING_MEDICATION: INDEPENDENT
BATHING: INDEPENDENT
DRESSING: INDEPENDENT
DOING_HOUSEWORK: INDEPENDENT
GROCERY_SHOPPING: INDEPENDENT

## 2025-05-21 ASSESSMENT — PATIENT HEALTH QUESTIONNAIRE - PHQ9
2. FEELING DOWN, DEPRESSED OR HOPELESS: NOT AT ALL
1. LITTLE INTEREST OR PLEASURE IN DOING THINGS: NOT AT ALL
SUM OF ALL RESPONSES TO PHQ9 QUESTIONS 1 AND 2: 0

## 2025-05-21 ASSESSMENT — ENCOUNTER SYMPTOMS
OCCASIONAL FEELINGS OF UNSTEADINESS: 1
LOSS OF SENSATION IN FEET: 0
DEPRESSION: 0

## 2025-05-21 NOTE — PROGRESS NOTES
Subjective   Maya Haines is a 80 y.o. female who is here for a routine exam/Medicare Wellness Visit  Active Problem List      Comprehensive Medical/Surgical/Social/Family History  Medical History[1]  Surgical History[2]  Social History     Social History Narrative    Not on file       Allergies and Medications  Ace inhibitors, Codeine, and Lisinopril  Medications Ordered Prior to Encounter[3]    New Concerns:  Carotid arteriesare better  .. Of blood thinner  patient is also here for follow-up of hypertension.. Symptoms do not include headache confusion visual disturbance dizziness shortness of breath chest pain syncope or palpitations. Recent blood pressure patient has   checking...130//50 By report there is good compliance with treatment. Pertinent medical history includes hypothyroid    .  HERE FOR RECHK OF THYROID Last  tsh      5.74   11/14/24 clinic visit was month (S) ago. Symptoms do not include weight gain, cold intolerance, fatigue, weakness, appetite, hair loss,     There is no known event that preceded symptom onset.  . Current treatment includes levothyroxine. Report there is good compliance with medical treatment. .. Yes dry  skin        The reflux esophagitis he has been occurring in a recurrent pattern for years. The course has been without change. The reflux is described as...mild. The patient remains compliant on meds.. The patient takes medications in a intermittent fashion. Denies dysphagia hoarseness or odynophagia... has hiatal hernia .  Had egd     1/25 6 cm hiatal hernia without Huan lesions dated 1/28/25  Lifestyle  Diet:  Healthy and Could be improved  Physical Activity: Not on file      Tobacco Use: Medium Risk (5/21/2025)    Patient History     Smoking Tobacco Use: Former     Smokeless Tobacco Use: Never     Passive Exposure: Not on file      Alcohol Use: Not on file      Depression: Not at risk (5/21/2025)    PHQ-2     PHQ-2 Score: 0      Stress: Not on file       Consultants:  Patient  "Care Team:  Ernst Camilo DO as PCP - General  Ernst Camilo DO as PCP - [facility] [employer] PCP  Frank Fuentes MD as Consulting Physician (Cardiology)  Albert Arriaga MD as Referring Physician (Sports Medicine)  Tom Fisher MD as Referring Physician (Vascular Surgery)   ..    Colorectal Screening:   colonoscopy  Date: 1/28/25    Breast Screening:   Due - Ordered today  History of abnormal mammogram: yes - ?  Family history of breast cancer: yes - cousin    Abnormal uterine bleeding: -  Urinary incontinence: -    Dexa Scan: Up to date5/24    Review of Systems  All other  pertinent  systems reviewed and are negative except  those  mentioned  in HPI   Objective   /63   Pulse 82   Temp 36.1 °C (97 °F)   Resp 18   Ht 1.575 m (5' 2\")   Wt 69.9 kg (154 lb)   SpO2 97%   BMI 28.17 kg/m²     Physical Exam  Vitals: I have reviewed the vitals  General: Well-developed.  In no acute distress.  Eyes:   sclera nonicteric.  Conjunctiva   hemorrhage right   stye right lower  lid.  No discharge.   HEAD: Normocephalic, atraumatic.  HEENT   Mucous membranes moist.  Posterior oropharynx nonerythematous, no tonsillar exudates.      No cervical lymphadenopathy.  Cardio: Regular rate and rhythm.  No murmur, rub or gallop.  Pulmonary: Lungs clear to auscultation in all fields.  No accessory muscle use.  GI/: Normal active bowel sounds.  Soft, nontender.  No masses or organomegaly appreciated.  Musculoskeletal: No gross deformities appreciated.  Neuro: Alert, age-appropriate.  Normal muscle tone.  Moving all extremities.  Skin: No rash, bruises or lesions.   Assessment/Plan   Problem List Items Addressed This Visit       GERD (gastroesophageal reflux disease)     The reflux esophagitis he has been occurring in a recurrent pattern for years. The course has been without change. The reflux is described as...mild. The patient remains compliant on meds.. The patient takes medications in a " intermittent fashion. Denies dysphagia hoarseness or odynophagia... has hiatal hernia .  Had egd     1/25 6 cm hiatal hernia without Huan lesions dated 1/28/25  Lifestyle stable and continue meds v         Relevant Medications    pantoprazole (ProtoNix) 40 mg EC tablet    Other Relevant Orders    CBC and Auto Differential    Hyperlipidemia    Relevant Medications    amLODIPine (Norvasc) 5 mg tablet    rosuvastatin (Crestor) 40 mg tablet    Primary hypertension    Relevant Medications    alendronate (Fosamax) 70 mg tablet (Start on 5/25/2025)    hydroCHLOROthiazide (Microzide) 12.5 mg tablet    losartan (Cozaar) 50 mg tablet    Other Relevant Orders    Comprehensive Metabolic Panel    Magnesium    Hypothyroidism    HERE FOR RECHK OF THYROID Last  tsh      5.74   11/14/24 clinic visit was month (S) ago. Symptoms do not include weight gain, cold intolerance, fatigue, weakness, appetite, hair loss,     There is no known event that preceded symptom onset.  . Current treatment includes levothyroxine. Report there is good compliance with medical treatment. .. Yes dry  skin  check  labs to ensure  stability          Relevant Medications    levothyroxine (Synthroid, Levoxyl) 125 mcg tablet    Other Relevant Orders    Thyroxine, Free    Thyroid Stimulating Hormone    Vulvar dermatitis    Patient has itching will refer to GYN and continue cream as previously given I tried Apsin alone and discussed risk of vaginal cancer regarding vulvar dystrophy/vulvar lichen sclerosus.         Relevant Medications    triamcinolone (Kenalog) 0.1 % cream    Other Relevant Orders    Referral to Gynecology    Conjunctival hemorrhage of right eye - Primary    Use artificial tears in right eye         Hordeolum externum of right lower eyelid    Try warm compresses washcloth hot or potato as discussed caution with too hot and 20 minutes 3 times daily with Ilotycin ointment at bedtime         Relevant Medications    erythromycin (Romycin) 5  mg/gram (0.5 %) ophthalmic ointment     Other Visit Diagnoses         Lipid screening        Relevant Orders    Lipid Panel      Anemia, unspecified type        Relevant Medications    ferrous gluconate 324 mg (38 mg elemental) tablet    Other Relevant Orders    Iron and TIBC    Ferritin            Reviewed Social Determinants of health with patient, discussed healthy lifestyle including 150 minutes of physical activity per week  Ordered/Reviewed baseline labwork -CBC, CMP, Lipid Panel  Immunizations: recommended shigles    .. And  rsv  Mammogram 2024  Dexa 7/24   on fosamax  Colorectal Screen utd     Health Counseling                [1]   Past Medical History:  Diagnosis Date    Anemia     Arthritis     Asthma     Cataract     Disease of thyroid gland     DVT (deep venous thrombosis) (Multi)     LEFT LEG    Fibromyalgia, primary     Fractures     LEFT KNEE    GERD (gastroesophageal reflux disease)     Hearing aid worn     Heart murmur     History of blood transfusion     Hyperlipidemia     Hypertension     Hypothyroidism     Joint pain     Lipoma of arm     LEFT UPPER    Osteopenia     Osteoporosis     Shortness of breath     Spinal stenosis     Ventricular septal defect (HHS-HCC)     Wears contact lenses     RIGHT EYE ONLY    Wears partial dentures     LOWER   [2]   Past Surgical History:  Procedure Laterality Date    CAROTID ENDARTERECTOMY Left     CT ANGIO NECK  07/29/2022    CT NECK ANGIO W AND WO IV CONTRAST 7/29/2022 [facility]    CT HEAD ANGIO W AND WO IV CONTRAST  07/29/2022    CT HEAD ANGIO W AND WO IV CONTRAST 7/29/2022 [facility]    EYE SURGERY      OTHER SURGICAL HISTORY  06/03/2020    Cardiac catheterization    OTHER SURGICAL HISTORY  06/03/2020    Hemorrhoidectomy    OTHER SURGICAL HISTORY Left 06/03/2020    Knee arthroscopy    OTHER SURGICAL HISTORY  06/03/2020    Colonoscopy    OTHER SURGICAL HISTORY  07/14/2022    Ablation UTERINE    OTHER SURGICAL HISTORY  07/26/2022     Cataract surgery    PATELLA SURGERY Left     x 2 - HARDWARE   [3]   Current Outpatient Medications on File Prior to Visit   Medication Sig Dispense Refill    cyclobenzaprine (Flexeril) 5 mg tablet       ASPIRIN ORAL Take 81 mg by mouth 1 (one) time each day.      cyanocobalamin (Vitamin B-12) 2,500 mcg tablet Take 1 tablet (2,500 mcg) by mouth once daily. 90 tablet 2    VIT B COMP NO.3-FOLIC-C-BIOTIN ORAL Take 1 tablet by mouth once daily with breakfast.      ZINC ACETATE ORAL Use 1 tablet in the mouth or throat once daily.      [DISCONTINUED] alendronate (Fosamax) 70 mg tablet Take 1 tablet (70 mg) by mouth 1 (one) time per week. 12 tablet 3    [DISCONTINUED] amLODIPine (Norvasc) 5 mg tablet Take 1 tablet (5 mg) by mouth once daily. 90 tablet 3    [DISCONTINUED] clopidogrel (Plavix) 75 mg tablet Take 1 tablet (75 mg) by mouth once daily. (Patient not taking: Reported on 5/21/2025) 90 tablet 3    [DISCONTINUED] ferrous gluconate 324 (38 Fe) mg tablet Take 1 tablet 3 times weekly with vitamin C 36 tablet 3    [DISCONTINUED] hydroCHLOROthiazide (Microzide) 12.5 mg tablet Take 1 tablet (12.5 mg) by mouth once daily. 90 tablet 3    [DISCONTINUED] levothyroxine (Synthroid, Levoxyl) 125 mcg tablet Take 1 tablet (125 mcg) by mouth once daily. 90 tablet 3    [DISCONTINUED] losartan (Cozaar) 50 mg tablet Take 1 tablet (50 mg) by mouth once daily. 90 tablet 3    [DISCONTINUED] pantoprazole (ProtoNix) 40 mg EC tablet Take 1 tablet (40 mg) by mouth once daily. 90 tablet 3    [DISCONTINUED] rosuvastatin (Crestor) 40 mg tablet Take 1 tablet (40 mg) by mouth once daily. 90 tablet 3     No current facility-administered medications on file prior to visit.

## 2025-05-21 NOTE — ASSESSMENT & PLAN NOTE
HERE FOR RECHK OF THYROID Last  tsh      5.74   11/14/24 clinic visit was month (S) ago. Symptoms do not include weight gain, cold intolerance, fatigue, weakness, appetite, hair loss,     There is no known event that preceded symptom onset.  . Current treatment includes levothyroxine. Report there is good compliance with medical treatment. .. Yes dry  skin  check  labs to ensure  stability

## 2025-05-21 NOTE — ASSESSMENT & PLAN NOTE
Try warm compresses washcloth hot or potato as discussed caution with too hot and 20 minutes 3 times daily with Ilotycin ointment at bedtime

## 2025-05-21 NOTE — ASSESSMENT & PLAN NOTE
The reflux esophagitis he has been occurring in a recurrent pattern for years. The course has been without change. The reflux is described as...mild. The patient remains compliant on meds.. The patient takes medications in a intermittent fashion. Denies dysphagia hoarseness or odynophagia... has hiatal hernia .  Had egd     1/25 6 cm hiatal hernia without Huan lesions dated 1/28/25  Lifestyle stable and continue meds v

## 2025-05-21 NOTE — ASSESSMENT & PLAN NOTE
Patient has itching will refer to GYN and continue cream as previously given I tried Apsin alone and discussed risk of vaginal cancer regarding vulvar dystrophy/vulvar lichen sclerosus.

## 2025-05-22 ENCOUNTER — APPOINTMENT (OUTPATIENT)
Dept: CARDIOLOGY | Facility: CLINIC | Age: 80
End: 2025-05-22
Payer: COMMERCIAL

## 2025-06-13 ENCOUNTER — ANCILLARY PROCEDURE (OUTPATIENT)
Dept: CARDIOLOGY | Facility: HOSPITAL | Age: 80
End: 2025-06-13
Payer: COMMERCIAL

## 2025-06-13 DIAGNOSIS — R06.02 SHORTNESS OF BREATH: ICD-10-CM

## 2025-06-13 PROCEDURE — 93016 CV STRESS TEST SUPVJ ONLY: CPT | Performed by: INTERNAL MEDICINE

## 2025-06-13 PROCEDURE — 78452 HT MUSCLE IMAGE SPECT MULT: CPT

## 2025-06-13 PROCEDURE — 78452 HT MUSCLE IMAGE SPECT MULT: CPT | Performed by: INTERNAL MEDICINE

## 2025-06-13 PROCEDURE — 3430000001 HC RX 343 DIAGNOSTIC RADIOPHARMACEUTICALS: Performed by: INTERNAL MEDICINE

## 2025-06-13 PROCEDURE — 93018 CV STRESS TEST I&R ONLY: CPT | Performed by: INTERNAL MEDICINE

## 2025-06-13 PROCEDURE — A9502 TC99M TETROFOSMIN: HCPCS | Performed by: INTERNAL MEDICINE

## 2025-06-13 PROCEDURE — 2500000004 HC RX 250 GENERAL PHARMACY W/ HCPCS (ALT 636 FOR OP/ED): Performed by: INTERNAL MEDICINE

## 2025-06-13 RX ORDER — REGADENOSON 0.08 MG/ML
0.4 INJECTION, SOLUTION INTRAVENOUS ONCE
Status: COMPLETED | OUTPATIENT
Start: 2025-06-13 | End: 2025-06-13

## 2025-06-13 RX ORDER — REGADENOSON 0.08 MG/ML
0.4 INJECTION, SOLUTION INTRAVENOUS ONCE
Status: CANCELLED | OUTPATIENT
Start: 2025-06-13 | End: 2025-06-13

## 2025-06-13 RX ADMIN — TETROFOSMIN 34.5 MILLICURIE: 0.23 INJECTION, POWDER, LYOPHILIZED, FOR SOLUTION INTRAVENOUS at 09:27

## 2025-06-13 RX ADMIN — REGADENOSON 0.4 MG: 0.08 INJECTION, SOLUTION INTRAVENOUS at 09:26

## 2025-06-13 RX ADMIN — TETROFOSMIN 11.4 MILLICURIE: 0.23 INJECTION, POWDER, LYOPHILIZED, FOR SOLUTION INTRAVENOUS at 08:09

## 2025-06-17 ENCOUNTER — APPOINTMENT (OUTPATIENT)
Dept: OBSTETRICS AND GYNECOLOGY | Facility: CLINIC | Age: 80
End: 2025-06-17
Payer: COMMERCIAL

## 2025-06-17 VITALS
WEIGHT: 154 LBS | DIASTOLIC BLOOD PRESSURE: 72 MMHG | HEIGHT: 62 IN | SYSTOLIC BLOOD PRESSURE: 144 MMHG | BODY MASS INDEX: 28.34 KG/M2

## 2025-06-17 DIAGNOSIS — N90.4 LICHEN SCLEROSUS OF FEMALE GENITALIA: Primary | ICD-10-CM

## 2025-06-17 PROCEDURE — 1036F TOBACCO NON-USER: CPT

## 2025-06-17 PROCEDURE — 99204 OFFICE O/P NEW MOD 45 MIN: CPT

## 2025-06-17 PROCEDURE — 3077F SYST BP >= 140 MM HG: CPT

## 2025-06-17 PROCEDURE — 1159F MED LIST DOCD IN RCRD: CPT

## 2025-06-17 PROCEDURE — 3078F DIAST BP <80 MM HG: CPT

## 2025-06-17 RX ORDER — CLOTRIMAZOLE AND BETAMETHASONE DIPROPIONATE 10; .64 MG/G; MG/G
CREAM TOPICAL
Qty: 45 G | Refills: 1 | Status: SHIPPED | OUTPATIENT
Start: 2025-06-17

## 2025-06-17 ASSESSMENT — PATIENT HEALTH QUESTIONNAIRE - PHQ9
2. FEELING DOWN, DEPRESSED OR HOPELESS: NOT AT ALL
SUM OF ALL RESPONSES TO PHQ9 QUESTIONS 1 AND 2: 0
1. LITTLE INTEREST OR PLEASURE IN DOING THINGS: NOT AT ALL

## 2025-06-17 NOTE — PROGRESS NOTES
Subjective   Patient ID: Maya Haines is a 80 y.o. female who presents for New Patient Visit (Dr. Camilo referred for vulvar swelling and itching x 5 years. Increased discharge at night. ).    HPI Maya Haines presents for vulvar itching that began 5 years ago. Reports that symptoms have worsened over the last 6 months. Itching is worse at night. States she was prescribed kenalog for vulva inflammation. Has not used in the last 3 weeks. Denies use of any other OTC interventions. Denies changes in her environment and not sexually active. Denies urinary incontinence. Patient reports being seen by Dr. Louise for the same concerns and was ordered a cream for treatment.       Review of Systems    Objective   Physical Exam  Pulmonary:      Effort: Pulmonary effort is normal.   Genitourinary:     Comments: White atrophic plaques noted in figure eight presentation from clitoral patton, over labia, and around anus. Scarring of clitoral patton loss of labia minora present. Narrow introitus.   Skin:     General: Skin is warm.   Neurological:      General: No focal deficit present.      Mental Status: She is alert.         Assessment/Plan   Diagnoses and all orders for this visit:  Lichen sclerosus of female genitalia  Lichen Sclerosis   - Discussed that LS is a chronic inflammatory dermatitis with an unknown origin though may autoimmune or genetic components   - Reviewed common symptoms including pruritus, dyspareunia, and vulvar pain  - Exam today with white atrophic plaques in a classic figure-of-eight distribution sparing the vaginal mucosa. Additionally noted structural changes including resorption of labia minora, scarring of clitoral patton, and narrowing of introitus   - Reviewed natural history of LS including potential for scarring as well as an increased risk of vulvar SCC (2-5%) if untreated. Explained that while LS cannot be cured, long-term management is possible with maintenance therapy and routine follow-up      -  Clobetasol 0.05% ointment BID x2 weeks, qhs x2 weeks, then 2-3x/week   - Counseled patient on appropriate application (0.5 fingertip units in a thin layer over affected area) and showed patient with mirror where ointment should be applied  - Additionally reviewed good vulvar hygiene with avoidance of irritants. Once initial steroid treatment complete, discussed daily use of greasy, unscented topical emollient for further symptom control. Reviewed vaginal lubricants for dyspareunia.   - Patient should start to see symptom improvement in 1-2 weeks. Additionally recommend monthly self-exams with a mirror and RTC if new lesions or areas of ulceration develop  - To follow-up in 1 months to assess treatment response. If well-controlled, will space to q6 months     Yesy JAIMES  I saw and evaluated the patient. I personally obtained the key and critical portions of the history and physical exam or was physically present for key and critical portions performed by the student. I reviewed the student's documentation and discussed the patient with the student. I agree with the student's medical decision making as documented in the note.     SANJU Cunningham-LISANDRA

## 2025-07-24 ENCOUNTER — APPOINTMENT (OUTPATIENT)
Dept: OBSTETRICS AND GYNECOLOGY | Facility: CLINIC | Age: 80
End: 2025-07-24
Payer: COMMERCIAL

## 2025-07-24 VITALS
SYSTOLIC BLOOD PRESSURE: 152 MMHG | WEIGHT: 153.4 LBS | HEIGHT: 62 IN | DIASTOLIC BLOOD PRESSURE: 66 MMHG | BODY MASS INDEX: 28.23 KG/M2

## 2025-07-24 DIAGNOSIS — N90.4 LICHEN SCLEROSUS OF FEMALE GENITALIA: Primary | ICD-10-CM

## 2025-07-24 PROCEDURE — 99212 OFFICE O/P EST SF 10 MIN: CPT | Performed by: OBSTETRICS & GYNECOLOGY

## 2025-07-24 PROCEDURE — 3078F DIAST BP <80 MM HG: CPT | Performed by: OBSTETRICS & GYNECOLOGY

## 2025-07-24 PROCEDURE — 1159F MED LIST DOCD IN RCRD: CPT | Performed by: OBSTETRICS & GYNECOLOGY

## 2025-07-24 PROCEDURE — 3077F SYST BP >= 140 MM HG: CPT | Performed by: OBSTETRICS & GYNECOLOGY

## 2025-07-24 PROCEDURE — G2211 COMPLEX E/M VISIT ADD ON: HCPCS | Performed by: OBSTETRICS & GYNECOLOGY

## 2025-07-24 ASSESSMENT — ENCOUNTER SYMPTOMS
NEUROLOGICAL NEGATIVE: 1
EYES NEGATIVE: 1
CARDIOVASCULAR NEGATIVE: 1
GASTROINTESTINAL NEGATIVE: 1
RESPIRATORY NEGATIVE: 1
ALLERGIC/IMMUNOLOGIC NEGATIVE: 1
CONSTITUTIONAL NEGATIVE: 1
ENDOCRINE NEGATIVE: 1
PSYCHIATRIC NEGATIVE: 1
MUSCULOSKELETAL NEGATIVE: 1
HEMATOLOGIC/LYMPHATIC NEGATIVE: 1

## 2025-07-24 NOTE — PROGRESS NOTES
Subjective   Patient ID: Maya Haines is a 80 y.o. female who presents for Lichen's management.  Maya was initially seen on 6/17/25 with Lucrecia        Review of Systems   Constitutional: Negative.    HENT: Negative.     Eyes: Negative.    Respiratory: Negative.     Cardiovascular: Negative.    Gastrointestinal: Negative.    Endocrine: Negative.    Genitourinary: Negative.    Musculoskeletal: Negative.    Skin: Negative.    Allergic/Immunologic: Negative.    Neurological: Negative.    Hematological: Negative.    Psychiatric/Behavioral: Negative.         Objective   Physical Exam  Vitals reviewed.   HENT:      Head: Normocephalic.      Nose: Nose normal.     Cardiovascular:      Rate and Rhythm: Normal rate.   Pulmonary:      Effort: Pulmonary effort is normal.   Genitourinary:       Comments: White plaques seen but improved in appearance from prior (compared to photo on patient's phone).     Musculoskeletal:      Cervical back: Normal range of motion.     Skin:     General: Skin is warm and dry.     Neurological:      General: No focal deficit present.      Mental Status: She is alert.         Assessment/Plan   Problem List Items Addressed This Visit       Lichen sclerosus of female genitalia - Primary    Overview   Clinically improving. There still remains patchy white near the clitoral patton and towards the anus, but her symptoms have resolved. She would like to try making sure she is getting her cream in these areas before pursuing biopsy. We will re-assess appearance in 1 month. She is agreeable. We discussed that typical treatment regimen is to de-escalate to the topical steroid twice weekly, but her itching returns if she skips a dose so she is going to stay on it daily. Advised use of aquaphor or vaseline in the morning then the cream at night. She is agreeable.                   Kena Ramirez MD 07/24/25 3:10 PM

## 2025-08-11 ENCOUNTER — APPOINTMENT (OUTPATIENT)
Dept: RADIOLOGY | Facility: HOSPITAL | Age: 80
End: 2025-08-11
Payer: COMMERCIAL

## 2025-09-02 ENCOUNTER — APPOINTMENT (OUTPATIENT)
Dept: RADIOLOGY | Facility: HOSPITAL | Age: 80
End: 2025-09-02
Payer: COMMERCIAL

## 2025-09-04 ENCOUNTER — TELEPHONE (OUTPATIENT)
Dept: PRIMARY CARE | Facility: CLINIC | Age: 80
End: 2025-09-04
Payer: COMMERCIAL

## 2025-09-06 ENCOUNTER — ANCILLARY PROCEDURE (OUTPATIENT)
Dept: URGENT CARE | Age: 80
End: 2025-09-06
Payer: COMMERCIAL

## 2025-09-06 ENCOUNTER — OFFICE VISIT (OUTPATIENT)
Dept: URGENT CARE | Age: 80
End: 2025-09-06
Payer: COMMERCIAL

## 2025-09-06 VITALS
DIASTOLIC BLOOD PRESSURE: 77 MMHG | SYSTOLIC BLOOD PRESSURE: 137 MMHG | RESPIRATION RATE: 18 BRPM | HEART RATE: 84 BPM | BODY MASS INDEX: 27.97 KG/M2 | WEIGHT: 152 LBS | TEMPERATURE: 97.7 F | HEIGHT: 62 IN | OXYGEN SATURATION: 98 %

## 2025-09-06 DIAGNOSIS — M10.9 ACUTE GOUT OF RIGHT FOOT, UNSPECIFIED CAUSE: Primary | ICD-10-CM

## 2025-09-06 DIAGNOSIS — M79.671 RIGHT FOOT PAIN: ICD-10-CM

## 2025-09-06 RX ORDER — PREDNISONE 20 MG/1
20 TABLET ORAL 2 TIMES DAILY
Qty: 14 TABLET | Refills: 0 | Status: SHIPPED | OUTPATIENT
Start: 2025-09-06 | End: 2025-09-13

## 2025-09-09 ENCOUNTER — APPOINTMENT (OUTPATIENT)
Dept: OBSTETRICS AND GYNECOLOGY | Facility: CLINIC | Age: 80
End: 2025-09-09
Payer: COMMERCIAL

## 2025-10-07 ENCOUNTER — APPOINTMENT (OUTPATIENT)
Dept: RADIOLOGY | Facility: CLINIC | Age: 80
End: 2025-10-07
Payer: COMMERCIAL

## 2025-11-12 ENCOUNTER — APPOINTMENT (OUTPATIENT)
Dept: CARDIOLOGY | Facility: CLINIC | Age: 80
End: 2025-11-12
Payer: COMMERCIAL

## 2025-11-20 ENCOUNTER — APPOINTMENT (OUTPATIENT)
Dept: PRIMARY CARE | Facility: CLINIC | Age: 80
End: 2025-11-20
Payer: COMMERCIAL

## (undated) DEVICE — 4-PORT MANIFOLD: Brand: NEPTUNE 2

## (undated) DEVICE — TOWEL,OR,DSP,ST,BLUE,STD,4/PK,20PK/CS: Brand: MEDLINE

## (undated) DEVICE — CLASSIC CLD THER SYS

## (undated) DEVICE — SPONGE,LAP,18"X18",DLX,XR,ST,5/PK,40/PK: Brand: MEDLINE

## (undated) DEVICE — MARKER SURG SKIN GENTIAN VLT REG TIP W/ 6IN RUL

## (undated) DEVICE — BLADE,CARBON-STEEL,15,STRL,DISPOSABLE,TB: Brand: MEDLINE

## (undated) DEVICE — PROBE ARTHSCP 50 DEG 3.5X135 MM ELECSURG INTEGR CABLE 50-S

## (undated) DEVICE — PADDING CAST W6INXL4YD POLY POR SPUN DACRON NON STERILE

## (undated) DEVICE — KIT ARMOR C DRP COLLAPSIBLE AND SELF EXP TOP CVR FOR FLUOROSCOPIC

## (undated) DEVICE — GLOVE SURG SZ 8 L12IN FNGR THK79MIL GRN LTX FREE

## (undated) DEVICE — COUNTER NDL 40 COUNT HLD 70 FOAM BLK ADH W/ MAG

## (undated) DEVICE — BLADE,CARBON-STEEL,10,STRL,DISPOSABLE,TB: Brand: MEDLINE

## (undated) DEVICE — SYRINGE MED 30ML STD CLR PLAS LUERLOCK TIP N CTRL DISP

## (undated) DEVICE — SUTURE MCRYL SZ 4-0 L27IN ABSRB UD L19MM PS-2 1/2 CIR PRIM Y426H

## (undated) DEVICE — LABEL MED MINI W/ MARKER

## (undated) DEVICE — SUTURE VCRL SZ 2-0 L36IN ABSRB UD L36MM CT-1 1/2 CIR J945H

## (undated) DEVICE — TUBING, SUCTION, 1/4" X 10', STRAIGHT: Brand: MEDLINE

## (undated) DEVICE — ADHESIVE SKIN CLSR 0.7ML TOP DERMBND ADV

## (undated) DEVICE — STOCKINETTE,IMPERVIOUS,12X48,STERILE: Brand: MEDLINE

## (undated) DEVICE — BLADE,CARBON-STEEL,11,STRL,DISPOSABLE,TB: Brand: MEDLINE

## (undated) DEVICE — BANDAGE COMPR M W6INXL10YD WHT BGE VELC E MTRX HK AND LOOP

## (undated) DEVICE — PADDING CAST W6INXL4YD RAYON UNDERCAST SOF-ROL

## (undated) DEVICE — CONNECTOR,T,STERILE: Brand: MEDLINE

## (undated) DEVICE — COVER LT HNDL BLU PLAS

## (undated) DEVICE — PAD COOL W10.9XL11.3IN UNIV REG HOSE WRP ON THER CLD

## (undated) DEVICE — DRESSING THER CLD UNIV STRL ICEMAN

## (undated) DEVICE — BANDAGE COBAN 4 IN COMPR W4INXL5YD FOAM COHESIVE QUIK STK SELF ADH SFT

## (undated) DEVICE — BUR SURG L73MM HD L4MM DIA4MM SHANK 235MM STR SHANK 8 FLUT

## (undated) DEVICE — APPLICATOR MEDICATED 26 CC SOLUTION HI LT ORNG CHLORAPREP

## (undated) DEVICE — ZIMMER® STERILE DISPOSABLE TOURNIQUET CUFF, DUAL PORT, SINGLE BLADDER, 24 IN. (61 CM)

## (undated) DEVICE — TUBING, SUCTION, 9/32" X 12', STRAIGHT: Brand: MEDLINE INDUSTRIES, INC.

## (undated) DEVICE — GOWN,AURORA,NONREINFORCED,LARGE: Brand: MEDLINE

## (undated) DEVICE — HYPODERMIC SAFETY NEEDLE: Brand: MAGELLAN

## (undated) DEVICE — GLOVE ORANGE PI 8   MSG9080

## (undated) DEVICE — NEEDLE SPNL 18GA L3.5IN W/ QNCKE SHARPER BVL DURA CLICK

## (undated) DEVICE — BANDAGE,ELASTIC,ESMARK,STERILE,4"X9',LF: Brand: MEDLINE

## (undated) DEVICE — PENCIL SMOKE EVAC PUSH BUTTON COATED

## (undated) DEVICE — MAT FLR ABSRB ECODRI-SAFE

## (undated) DEVICE — DRAPE,U/ SHT,SPLIT,PLAS,STERIL: Brand: MEDLINE

## (undated) DEVICE — GOWN,SIRUS,POLYRNF,BRTHSLV,XLN/XL,20/CS: Brand: MEDLINE

## (undated) DEVICE — PACK,BASIC: Brand: MEDLINE

## (undated) DEVICE — SUTURE VCRL SZ 3-0 L27IN ABSRB UD L26MM SH 1/2 CIR J416H

## (undated) DEVICE — 3M™ STERI-DRAPE™ ARTHROSCOPY SHEET WITH POUCH 1194: Brand: STERI-DRAPE™

## (undated) DEVICE — TUBE IRRIG L8IN LNG PT W/ CONN FOR PMP SYS REDEUCE

## (undated) DEVICE — [TOMCAT CUTTER, ARTHROSCOPIC SHAVER BLADE,  DO NOT RESTERILIZE,  DO NOT USE IF PACKAGE IS DAMAGED,  KEEP DRY,  KEEP AWAY FROM SUNLIGHT]: Brand: FORMULA